# Patient Record
Sex: FEMALE | Race: WHITE | NOT HISPANIC OR LATINO | Employment: UNEMPLOYED | URBAN - METROPOLITAN AREA
[De-identification: names, ages, dates, MRNs, and addresses within clinical notes are randomized per-mention and may not be internally consistent; named-entity substitution may affect disease eponyms.]

---

## 2017-01-21 ENCOUNTER — TELEPHONE (OUTPATIENT)
Dept: PEDIATRICS | Facility: CLINIC | Age: 52
End: 2017-01-21

## 2017-01-21 NOTE — TELEPHONE ENCOUNTER
1/21/2017    Call Regarding Preventive Health Screening Colonoscopy, Mammogram and Cervical/PAP    Attempt 1    Message on voicemail     Comments:       Outreach   cnt

## 2017-03-02 ENCOUNTER — TELEPHONE (OUTPATIENT)
Dept: PEDIATRICS | Facility: CLINIC | Age: 52
End: 2017-03-02

## 2017-03-02 NOTE — TELEPHONE ENCOUNTER
Panel Management Review      Patient has the following on her problem list: None      Composite cancer screening  Chart review shows that this patient is due/due soon for the following Pap Smear, Mammogram and Colonoscopy  Summary:    Patient is due/failing the following:   COLONOSCOPY, MAMMOGRAM, PAP and PHYSICAL    Action needed:   Patient needs office visit for Px, PAP, mammogram, colonoscopy.    Type of outreach:    Sent [facility] message.    Questions for provider review:    None                                                                                                                                    Dorothy Lundy LPN       Chart routed to Care Team .

## 2017-05-17 NOTE — TELEPHONE ENCOUNTER
5/17/2017    Call Regarding Preventive Health Screening Colonoscopy, Mammogram and Cervical/PAP    Attempt 2    Message on voicemail     Comments:       Outreach   VISHNU

## 2017-06-20 NOTE — TELEPHONE ENCOUNTER
Call Regarding Preventive Health Screening Colonoscopy, Mammogram and Cervical/PAP    Attempt 3    Message on voicemail     Comments:       Outreach   Giovana Luna

## 2017-07-08 ENCOUNTER — HEALTH MAINTENANCE LETTER (OUTPATIENT)
Age: 52
End: 2017-07-08

## 2017-12-19 ASSESSMENT — ANXIETY QUESTIONNAIRES
3. WORRYING TOO MUCH ABOUT DIFFERENT THINGS: SEVERAL DAYS
GAD7 TOTAL SCORE: 2
GAD7 TOTAL SCORE: 2
4. TROUBLE RELAXING: NOT AT ALL
5. BEING SO RESTLESS THAT IT IS HARD TO SIT STILL: NOT AT ALL
7. FEELING AFRAID AS IF SOMETHING AWFUL MIGHT HAPPEN: NOT AT ALL
2. NOT BEING ABLE TO STOP OR CONTROL WORRYING: NOT AT ALL
1. FEELING NERVOUS, ANXIOUS, OR ON EDGE: NOT AT ALL
7. FEELING AFRAID AS IF SOMETHING AWFUL MIGHT HAPPEN: NOT AT ALL
6. BECOMING EASILY ANNOYED OR IRRITABLE: SEVERAL DAYS

## 2017-12-19 ASSESSMENT — ENCOUNTER SYMPTOMS
JOINT SWELLING: 0
SORE THROAT: 0
MUSCLE WEAKNESS: 0
HOARSE VOICE: 0
HOT FLASHES: 1
SMELL DISTURBANCE: 0
MYALGIAS: 0
TASTE DISTURBANCE: 0
MUSCLE CRAMPS: 0
SINUS CONGESTION: 0
TROUBLE SWALLOWING: 0
SINUS PAIN: 0
NECK PAIN: 0
ARTHRALGIAS: 1
DECREASED LIBIDO: 0
STIFFNESS: 1
NECK MASS: 0
BACK PAIN: 0

## 2017-12-20 ENCOUNTER — OFFICE VISIT (OUTPATIENT)
Dept: OBGYN | Facility: CLINIC | Age: 52
End: 2017-12-20
Attending: NURSE PRACTITIONER
Payer: COMMERCIAL

## 2017-12-20 ENCOUNTER — RADIANT APPOINTMENT (OUTPATIENT)
Dept: MAMMOGRAPHY | Facility: CLINIC | Age: 52
End: 2017-12-20
Attending: INTERNAL MEDICINE
Payer: COMMERCIAL

## 2017-12-20 VITALS
WEIGHT: 126.5 LBS | DIASTOLIC BLOOD PRESSURE: 68 MMHG | HEIGHT: 68 IN | SYSTOLIC BLOOD PRESSURE: 122 MMHG | BODY MASS INDEX: 19.17 KG/M2 | HEART RATE: 56 BPM

## 2017-12-20 DIAGNOSIS — Z12.4 SCREENING FOR MALIGNANT NEOPLASM OF CERVIX: ICD-10-CM

## 2017-12-20 DIAGNOSIS — Z23 NEED FOR PROPHYLACTIC VACCINATION AND INOCULATION AGAINST INFLUENZA: ICD-10-CM

## 2017-12-20 DIAGNOSIS — Z01.419 ENCOUNTER FOR GYNECOLOGICAL EXAMINATION WITHOUT ABNORMAL FINDING: ICD-10-CM

## 2017-12-20 DIAGNOSIS — Z00.00 VISIT FOR PREVENTIVE HEALTH EXAMINATION: Primary | ICD-10-CM

## 2017-12-20 DIAGNOSIS — Z12.31 VISIT FOR SCREENING MAMMOGRAM: ICD-10-CM

## 2017-12-20 PROCEDURE — G0008 ADMIN INFLUENZA VIRUS VAC: HCPCS | Mod: ZF

## 2017-12-20 PROCEDURE — 99212 OFFICE O/P EST SF 10 MIN: CPT | Mod: 25

## 2017-12-20 PROCEDURE — 25000128 H RX IP 250 OP 636: Mod: ZF

## 2017-12-20 PROCEDURE — G0145 SCR C/V CYTO,THINLAYER,RESCR: HCPCS | Performed by: NURSE PRACTITIONER

## 2017-12-20 PROCEDURE — 87624 HPV HI-RISK TYP POOLED RSLT: CPT | Performed by: NURSE PRACTITIONER

## 2017-12-20 PROCEDURE — 90686 IIV4 VACC NO PRSV 0.5 ML IM: CPT | Mod: ZF

## 2017-12-20 PROCEDURE — G0202 SCR MAMMO BI INCL CAD: HCPCS

## 2017-12-20 ASSESSMENT — ANXIETY QUESTIONNAIRES: GAD7 TOTAL SCORE: 2

## 2017-12-20 ASSESSMENT — PATIENT HEALTH QUESTIONNAIRE - PHQ9: SUM OF ALL RESPONSES TO PHQ QUESTIONS 1-9: 0

## 2017-12-20 NOTE — PROGRESS NOTES
Progress Note    SUBJECTIVE:  Vonnie Nguyen is an 52 year old, , who requests an Annual Preventive Exam.   Hx of hypothyroidism, managed by PCP in [place].    Concerns today include: none    Menstrual History:  Menstrual History 2016   LAST MENSTRUAL PERIOD 10/30/2016 2017 -   Menarche age - - 13   Period Cycle (Days) - - 29-36   Period Duration (Days) - - 5 days   Method of Contraception - - None   Period Pattern - - Irregular   Menstrual Flow - - Moderate   Menstrual Control - - Tampon   Dysmenorrhea - - None   Reviewed Today - - Yes     Had 1 span of 3-4 months with no menses last year.  Then started back up having regular menses, once per month.  Bleeding is light to moderate; lasts ~5 days, without any significant cramping.  Started to have night sweats and hot flashes ~1 yr ago.  Has made lifestyle changes, including stopping drinking caffinated and alcoholic beverages and taking a Vitamin E supplement.  States she no longer has any vasomotor symptoms; denies vaginal dryness.     Sexual Hx:  - Sexually with , in mutually monogamous relationship; decline STD testing today  - No sexual concerns  - Contraception =  had vasectomy      Social Hx:  - Homemaker, has two children ages 14 and 16 who are very active; enjoys going to the [employer] in [place] with her family    Last pap smear: 5/15/2012 NIL   History of abnormal Pap smear: NO - age 30-65 PAP every 5 years with negative HPV co-testing recommended    Mammogram current: yes, completed this morning  Last Mammogram:   Ma Screening Digital Bilateral    Result Date: 2014  Narrative: Examination: Bilateral digital screening mammography with computer aided detection. 9/3/2014 Comparison: Mammogram 2012, 2000 and, 2010, 2006 History: No symptoms, routine screening. No personal or family history of breast cancer. BREAST DENSITY: Extremely dense. COMMENTS: No significant change since prior.           Diet:  - Eats very little iron rich foods, takes a supplement of iron, which has given her more energy (325mg daily of ferrous sulfate)  - 4-5 fruits/ vegetables per day; 2-3 servings of calcium per day; takes a calcium and Vitamin D supplement    Exercise: exercises often; enjoys going playing hockey, skating, and going to the gym    Last Colonoscopy: never had a colonoscopy, due    HISTORY:    Current Outpatient Prescriptions on File Prior to Visit:  Glucosamine-Chondroitin (GLUCOSAMINE CHONDR COMPLEX PO)    vitamin E (TOCOPHEROL) 1000 UNIT capsule Take 2,000 Units by mouth daily   levothyroxine (SYNTHROID, LEVOTHROID) 150 MCG tablet Take 1 tablet (150 mcg) by mouth daily   multivitamin, therapeutic with minerals (MULTI-VITAMIN) TABS Take 1 tablet by mouth daily   Calcium Carbonate-Vitamin D (CALCIUM 600+D PO)    IRON PO    Probiotic Product (PROBIOTIC DAILY PO)      No current facility-administered medications on file prior to visit.   Allergies   Allergen Reactions     No Known Allergies      Immunization History   Administered Date(s) Administered     Flu > 3 Years 2017     Influenza (IIV3) PF 2010     Influenza Vaccine IM 3yrs+ 4 Valent IIV4 10/02/2015, 2016     TDAP Vaccine (Adacel) 2013     Obstetric History       T1      L2     SAB2   TAB0   Ectopic0   Multiple0   Live Births2      Past Medical History:   Diagnosis Date     Grave's disease     Iodine 131 in      Hypothyroidism      Menarche age 13    cycles q  28-30 x 5 d     Past Surgical History:   Procedure Laterality Date     EXTRACTION(S) DENTAL  age 21     Family History   Problem Relation Age of Onset     Hypertension Mother 64     CANCER Mother      lung Ca (smoker) basal skin     Other Cancer Mother      Lung cancer     Prostate Cancer Father       age 78     Social History     Social History     Marital status:      Spouse name: N/A     Number of children: N/A     Years of education: N/A      Occupational History     housewife/parent      RN on Transplant unit 'till 2010     Social History Main Topics     Smoking status: Never Smoker     Smokeless tobacco: Never Used     Alcohol use Yes      Comment: 1-2s 3x/wk     Drug use: No     Sexual activity: Yes     Partners: Male     Birth control/ protection: Male Surgical      Comment:  had vastectomy     Other Topics Concern     Blood Transfusions No     Caffeine Concern No     1-2/d     Occupational Exposure No     Hobby Hazards Yes     Ice hockey     Sleep Concern No     light sleeper since having having kids 10 yrs 2002.     Stress Concern No     parenting busy schedule-->coping since D/C'd work 2010     Weight Concern No     Special Diet No     Back Care No     Exercise No     Ice hockey 90' 3x/wk!     Bike Helmet No     Seat Belt No     Social History Narrative    How much exercise per week? 4 days    How much calcium per day? 600mg       How much caffeine per day? None    How much vitamin D per day? Supplement    Do you/your family wear seatbelts?  Yes    Do you/your family use safety helmets? yes    Do you/your family use sunscreen? Yes    Do you/your family keep firearms in the home? Yes    Do you/your family have a smoke detector(s)? Yes    Do you feel safe in your home? Yes    Has anyone ever touched you in an unwanted manner? No     Explain     SEE ELIAS MEJIA 09/03/2014     Reviewed Rafal ROMANO  PHQ-9 SCORE 12/20/2017   Total Score 0     NADEEM-7 SCORE 12/19/2017   Total Score 2 (minimal anxiety)   Total Score 2   Answers for HPI/ROS submitted by the patient on 12/19/2017   NADEEM 7 TOTAL SCORE: 2  PHQ-2 Score: 0  General Symptoms: No  Skin Symptoms: No  HENT Symptoms: Yes  EYE SYMPTOMS: No  HEART SYMPTOMS: No  LUNG SYMPTOMS: No  INTESTINAL SYMPTOMS: No  URINARY SYMPTOMS: No  GYNECOLOGIC SYMPTOMS: Yes  BREAST SYMPTOMS: No  SKELETAL SYMPTOMS: Yes  BLOOD SYMPTOMS: No  NERVOUS SYSTEM SYMPTOMS: No  MENTAL HEALTH SYMPTOMS: No  Ear pain:  "No  Ear discharge: No  Hearing loss: Yes  Tinnitus: Yes  Nosebleeds: No  Congestion: No  Sinus pain: No  Trouble swallowing: No   Voice hoarseness: No  Mouth sores: No  Sore throat: No  Tooth pain: No  Gum tenderness: No  Bleeding gums: No  Change in taste: No  Change in sense of smell: No  Dry mouth: No  Hearing aid used: No  Neck lump: No  Back pain: No  Muscle aches: No  Neck pain: No  Swollen joints: No  Joint pain: Yes  Bone pain: No  Muscle cramps: No  Muscle weakness: No  Joint stiffness: Yes  Bone fracture: No  Bleeding or spotting between periods: No  Heavy or painful periods: No  Irregular periods: Yes  Vaginal discharge: No  Hot flashes: Yes  Vaginal dryness: No  Genital ulcers: No  Reduced libido: No  Painful intercourse: No  Difficulty with sexual arousal: No  Post-menopausal bleeding: No    Feels safe. Reports mental health is well. Exercise helps with any mood changes or feeling down in the winter and gives her more energy. States she has a supportive  and close family.    EXAM:  Blood pressure 122/68, pulse 56, height 1.715 m (5' 7.52\"), weight 57.4 kg (126 lb 8 oz), last menstrual period 11/30/2017, not currently breastfeeding. Body mass index is 19.51 kg/(m^2).  General - pleasant female in no acute distress.  Skin - no suspicious lesions or rashes  EENT-  PERRLA, euthyroid with out palpable nodules  Neck - supple without lymphadenopathy.  Lungs - clear to auscultation bilaterally.  Heart - regular rate and rhythm without murmur.  Abdomen - soft, nontender, nondistended, no masses or organomegaly noted.  Musculoskeletal - no gross deformities.  Neurological - normal strength, sensation, and mental status.    Breast Exam:  Breast: Without visible skin changes. No dimpling or lesions seen.   Breasts supple, non-tender with palpation, no dominant mass, nodularity, or nipple discharge noted bilaterally. Axillary nodes negative.      Pelvic Exam:  EG/BUS: Normal genital architecture without " lesions; mild estrogen loss atrophy, erythema or abnormal secretions; Bartholin's, Urethra, [place]'s normal   Urethral meatus: normal   Urethra: no masses, tenderness, or scarring   Bladder: no masses or tenderness   Vagina: vaginal tissue pink, minimal rugae; creamy, white, odorless secretions  Cervix: pink, moist, closed, without lesion or CMT; friable with pap smear  Uterus: anteverted,  and small, smooth, firm, mobile w/o pain  Adnexa: Within normal limits and No masses, nodularity, tenderness  Rectum: anus normal     ASSESSMENT:  Encounter Diagnoses   Name Primary?     Visit for preventive health examination Yes     Need for prophylactic vaccination and inoculation against influenza      Screening for malignant neoplasm of cervix      Encounter for gynecological examination without abnormal finding         PLAN:   Orders Placed This Encounter   Procedures     Pelvic and Breast Exam Procedure []     Pap Smear Exam [] Do Not Remove     FLU VACCINE, 3 YRS +, IM [[id number]]     Pap imaged thin layer screen with HPV - recommended age 30 - 65 years (select HPV order below)     HPV High Risk Types DNA Cervical     Flu vaccine administered today  Pap smear with HPV testing completed today  Encouraged colonoscopy; patient will consider and talk to her PCP about alternative options for colon cancer screening  Additional teaching done at this visit regarding calcium (1200 mg per day), self breast exam, exercise, perimenopause, mental health and weight/diet.  Patient to notify provider if she experiences a change in her menses, especially prolonged or heavy bleeding.    Return to clinic in one year.  Follow-up as needed.    Pamela Winn, ALBERT, APRN, WHNP

## 2017-12-20 NOTE — LETTER
2017       RE: Vonnie Nguyen  [id number]  [place] [place] [id number]     Dear Colleague,    Thank you for referring your patient, Vonnie Nguyen, to the [facility] at [facility]. Please see a copy of my visit note below.      Progress Note    SUBJECTIVE:  Vonnie Nguyen is an 52 year old, , who requests an Annual Preventive Exam.   Hx of hypothyroidism, managed by PCP in [place].    Concerns today include: none    Menstrual History:  Menstrual History 2016   LAST MENSTRUAL PERIOD 10/30/2016 2017 -   Menarche age - - 13   Period Cycle (Days) - - 29-36   Period Duration (Days) - - 5 days   Method of Contraception - - None   Period Pattern - - Irregular   Menstrual Flow - - Moderate   Menstrual Control - - Tampon   Dysmenorrhea - - None   Reviewed Today - - Yes     Had 1 span of 3-4 months with no menses last year.  Then started back up having regular menses, once per month.  Bleeding is light to moderate; lasts ~5 days, without any significant cramping.  Started to have night sweats and hot flashes ~1 yr ago.  Has made lifestyle changes, including stopping drinking caffinated and alcoholic beverages and taking a Vitamin E supplement.  States she no longer has any vasomotor symptoms; denies vaginal dryness.     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 9/3/2014 Comparison: Mammogram 2012, 2000 and, 2010, 2006 History: No symptoms, routine screening. No personal or family history of breast cancer. BREAST DENSITY: Extremely dense. COMMENTS: No significant change since prior.          Diet:  - Eats very little iron rich foods, takes a supplement of iron, which has given her more energy (325mg daily of ferrous sulfate)  - 4-5 fruits/ vegetables per day; 2-3 servings of calcium per day; takes a calcium and Vitamin D supplement    Exercise: exercises often; enjoys going playing hockey, skating, and going to the gym    Last Colonoscopy: never had a colonoscopy, due    HISTORY:    Current Outpatient Prescriptions on File Prior to Visit:  Glucosamine-Chondroitin (GLUCOSAMINE CHONDR COMPLEX PO)    vitamin E (TOCOPHEROL) 1000 UNIT capsule Take 2,000 Units by mouth daily   levothyroxine (SYNTHROID, LEVOTHROID) 150 MCG tablet Take 1 tablet (150 mcg) by mouth daily   multivitamin, therapeutic with minerals (MULTI-VITAMIN) TABS Take 1 tablet by mouth daily   Calcium Carbonate-Vitamin D (CALCIUM 600+D PO)    IRON PO    Probiotic Product (PROBIOTIC DAILY PO)      No current facility-administered medications on file prior to visit.   Allergies   Allergen Reactions     No Known Allergies      Immunization History   Administered Date(s) Administered     Flu > 3 Years 2017     Influenza (IIV3) PF 2010     Influenza Vaccine IM 3yrs+ 4 Valent IIV4 10/02/2015, 2016     TDAP Vaccine (Adacel) 2013     Obstetric History       T1      L2     SAB2   TAB0   Ectopic0   Multiple0   Live Births2      Past Medical History:   Diagnosis Date     Grave's disease 1980    Iodine 131 in      Hypothyroidism      Menarche age 13    cycles q  28-30 x 5 d     Past Surgical History:   Procedure Laterality Date     EXTRACTION(S) DENTAL  age 21     Family History   Problem Relation Age of Onset      Hypertension Mother 64     CANCER Mother      lung Ca (smoker) basal skin     Other Cancer Mother      Lung cancer     Prostate Cancer Father       age 78     Social History     Social History     Marital status:      Spouse name: N/A     Number of children: N/A     Years of education: N/A     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     Stress Concern No     parenting busy schedule-->coping since D/C'd work      Weight Concern No     Special Diet No     Back Care No     Exercise No     Ice hockey 90' 3x/wk!     Bike Helmet No     Seat Belt No     Social History Narrative    How much exercise per week? 4 days    How much calcium per day? 600mg       How much caffeine per day? None    How much vitamin D per day? Supplement    Do you/your family wear seatbelts?  Yes    Do you/your family use safety helmets? yes    Do you/your family use sunscreen? Yes    Do you/your family keep firearms in the home? Yes    Do you/your family have a smoke detector(s)? Yes    Do you feel safe in your home? Yes    Has anyone ever touched you in an unwanted manner? No     Explain     SEE ELIAS MEJIA 2014     Reviewed Rafal ROMANO  PHQ-9 SCORE 2017   Total Score 0     NADEEM-7 SCORE 2017   Total Score 2 (minimal anxiety)   Total Score 2   Answers for HPI/ROS submitted by the patient on 2017   NADEEM 7 TOTAL SCORE: 2  PHQ-2 Score: 0  General Symptoms:  "No  Skin Symptoms: No  HENT Symptoms: Yes  EYE SYMPTOMS: No  HEART SYMPTOMS: No  LUNG SYMPTOMS: No  INTESTINAL SYMPTOMS: No  URINARY SYMPTOMS: No  GYNECOLOGIC SYMPTOMS: Yes  BREAST SYMPTOMS: No  SKELETAL SYMPTOMS: Yes  BLOOD SYMPTOMS: No  NERVOUS SYSTEM SYMPTOMS: No  MENTAL HEALTH SYMPTOMS: No  Ear pain: No  Ear discharge: No  Hearing loss: Yes  Tinnitus: Yes  Nosebleeds: No  Congestion: No  Sinus pain: No  Trouble swallowing: No   Voice hoarseness: No  Mouth sores: No  Sore throat: No  Tooth pain: No  Gum tenderness: No  Bleeding gums: No  Change in taste: No  Change in sense of smell: No  Dry mouth: No  Hearing aid used: No  Neck lump: No  Back pain: No  Muscle aches: No  Neck pain: No  Swollen joints: No  Joint pain: Yes  Bone pain: No  Muscle cramps: No  Muscle weakness: No  Joint stiffness: Yes  Bone fracture: No  Bleeding or spotting between periods: No  Heavy or painful periods: No  Irregular periods: Yes  Vaginal discharge: No  Hot flashes: Yes  Vaginal dryness: No  Genital ulcers: No  Reduced libido: No  Painful intercourse: No  Difficulty with sexual arousal: No  Post-menopausal bleeding: No    Feels safe. Reports mental health is well. Exercise helps with any mood changes or feeling down in the winter and gives her more energy. States she has a supportive  and close family.    EXAM:  Blood pressure 122/68, pulse 56, height 1.715 m (5' 7.52\"), weight 57.4 kg (126 lb 8 oz), last menstrual period 11/30/2017, not currently breastfeeding. Body mass index is 19.51 kg/(m^2).  General - pleasant female in no acute distress.  Skin - no suspicious lesions or rashes  EENT-  PERRLA, euthyroid with out palpable nodules  Neck - supple without lymphadenopathy.  Lungs - clear to auscultation bilaterally.  Heart - regular rate and rhythm without murmur.  Abdomen - soft, nontender, nondistended, no masses or organomegaly noted.  Musculoskeletal - no gross deformities.  Neurological - normal strength, sensation, and " mental status.    Breast Exam:  Breast: Without visible skin changes. No dimpling or lesions seen.   Breasts supple, non-tender with palpation, no dominant mass, nodularity, or nipple discharge noted bilaterally. Axillary nodes negative.      Pelvic Exam:  EG/BUS: Normal genital architecture without lesions; mild estrogen loss atrophy, erythema or abnormal secretions; Bartholin's, Urethra, [place]'s normal   Urethral meatus: normal   Urethra: no masses, tenderness, or scarring   Bladder: no masses or tenderness   Vagina: vaginal tissue pink, minimal rugae; creamy, white, odorless secretions  Cervix: pink, moist, closed, without lesion or CMT; friable with pap smear  Uterus: anteverted,  and small, smooth, firm, mobile w/o pain  Adnexa: Within normal limits and No masses, nodularity, tenderness  Rectum: anus normal     ASSESSMENT:  Encounter Diagnoses   Name Primary?     Visit for preventive health examination Yes     Need for prophylactic vaccination and inoculation against influenza      Screening for malignant neoplasm of cervix      Encounter for gynecological examination without abnormal finding         PLAN:   Orders Placed This Encounter   Procedures     Pelvic and Breast Exam Procedure []     Pap Smear Exam [] Do Not Remove     FLU VACCINE, 3 YRS +, IM [[id number]]     Pap imaged thin layer screen with HPV - recommended age 30 - 65 years (select HPV order below)     HPV High Risk Types DNA Cervical     Flu vaccine administered today  Pap smear with HPV testing completed today  Encouraged colonoscopy; patient will consider and talk to her PCP about alternative options for colon cancer screening  Additional teaching done at this visit regarding calcium (1200 mg per day), self breast exam, exercise, perimenopause, mental health and weight/diet.  Patient to notify provider if she experiences a change in her menses, especially prolonged or heavy bleeding.        Return to clinic in one year.  Follow-up as  needed.    Pamela Winn, DNP, APRN, WHNP

## 2017-12-20 NOTE — MR AVS SNAPSHOT
After Visit Summary   12/20/2017    Vonnie Nguyen    MRN: [id number]           Patient Information     Date Of Birth          1965        Visit Information        Provider Department      12/20/2017 1:00 PM Pamela Winn APRN CNP [facility]        Today's Diagnoses     Visit for preventive health examination        Need for prophylactic vaccination and inoculation against influenza        Screening for malignant neoplasm of cervix        Encounter for gynecological examination without abnormal finding          Care Instructions    Pap smear was done today  Flu vaccine was administered today  Consider colonoscopy or other colon cancer screening test; may discuss with primary care provider.      PREVENTIVE HEALTH RECOMMENDATIONS:   Most women need a yearly breast and pelvic exam.    A PAP screen, a test done DURING a pelvic exam, is NO longer recommended yearly.    March 2013, screening guidelines recommended by ACOG for PAP screen are:    1) First pap at age 21.    2) Pap every 3 years until age 30.    3) After age 30, pap every 3 years or Pap with HR HPV screen every 5 years until age 65.  4) Women do NOT need a vaginal Pap screen after a hysterectomy (surgical removal of the uterus) when they have not had cancer.    Exceptions:  1) Yearly pap if HIV+ or immunosuppressed secondary to organ transplant  2) HUGO II-III continue routine screening for 20 years.    I encourage you continue looking for opportunities to choose a healthy lifestyle:       * Choose to eat a heart healthy diet. Check out the FOOD PLATE guidelines at: http://www.choosemyplate.gov/ for helpful hints on weight and cholesterol management.  Balance your caloric intake with exercise to maintain a BMI in the 22 to 26 range. For bone health: Eat calcium-rich foods like yogurt, broccoli or take chewable calcium pills (500 to 600 mg) twice a day with food.       * Exercise for at least an average of 30  minutes a day, 5 days of the week. This will help you control your weight, release stress, and help prevent disease.      * Take a Vitamin D3 supplement daily fall through spring and during summer unless you cpdc47-39' full body sun exposure to skin without sunscreen.      * DO wear sunscreen to prevent skin cancer after the first 15-30 minutes.      * Identify stressors in your life, find ways to release the stress, and, make time for yourself. PLEASE ask for help if mood changes last longer than two weeks.     * Limit alcohol to one drink per day.  No smoking.  Avoid second hand smoke. If you smoke, ask for help to stop.       *  If you are in a sexual relationship, talk with your partner about possible infection risks and take action to protect yourself from exposure to a sexual infection.    Please request an infection screen for STIs (sexually transmitted infections) if you are less than age 26 OR believe that you may be at risk.     Get a flu shot each year. Get a tetanus shot every 10 years. EVERYONE needs a pertussis (Whooping cough) booster.    See your dentist twice a year for an exam and preventive care cleaning.     Consider the following screen tests:    1) cholesterol test every 5 years.     2) yearly mammogram after age 40 unless you have identified risks.    3) colonoscopy every 10 years after age 50 unless you have identified risks.    4) diabetes blood test screening if you are at risk for diabetes.      Additional information that you may also find helpful:  The Internet now gives us access to LOTS of information -- some of it helpful, research documented and also plenty of harmful, anecdotal information that may not pertain to your situtaion. Consider visiting the following websites for accurate health information:    www.vitamindcouncil.org/ : Info and ongoing research re Vitamin D    www.fairview.org : Up to date and easily searchable information on multiple topics.    www.medlineplus.gov :  medication info, interactive tutorials, watch real surgeries online    www.cdc.gov : public health info, travel advisories, epidemics (H1N1)    www.eda/std.org: current research re diagnosis, treatment and prevention of sexually contacted infections.    www.health.state.[place] : [place] dept [facility], public health issues in [place], N1N1    www.familydoctor.org : good info from the Academy of Family Physicians                Follow-ups after your visit        Follow-up notes from your care team     Return in 1 year (on 12/20/2018) for Preventative Health Visit.      Who to contact     Please call your clinic at [phone] to:    Ask questions about your health    Make or cancel appointments    Discuss your medicines    Learn about your test results    Speak to your doctor   If you have compliments or concerns about an experience at your clinic, or if you wish to file a complaint, please contact [facility] Patient Relations at [phone] or email us at [email or web address]         Additional Information About Your Visit        [email or web address] Information     Kout gives you secure access to your electronic health record. If you see a primary care provider, you can also send messages to your care team and make appointments. If you have questions, please call your primary care clinic.  If you do not have a primary care provider, please call [phone] and they will assist you.      Kout is an electronic gateway that provides easy, online access to your medical records. With Kout, you can request a clinic appointment, read your test results, renew a prescription or communicate with your care team.     To access your existing account, please contact your [facility] or call [phone] for assistance.        Care EveryWhere ID     This is your Care EveryWhere ID. This could be used by other organizations to access your [facility]  "records  [id number]        Your Vitals Were     Pulse Height Last Period BMI (Body Mass Index)          56 1.715 m (5' 7.52\") 11/30/2017 19.51 kg/m2         Blood Pressure from Last 3 Encounters:   12/20/17 122/68   11/21/16 112/74   10/02/15 102/64    Weight from Last 3 Encounters:   12/20/17 57.4 kg (126 lb 8 oz)   11/21/16 55.4 kg (122 lb 1.6 oz)   10/02/15 57.8 kg (127 lb 8 oz)              We Performed the Following     FLU VACCINE, 3 YRS +, IM [45798]     HPV High Risk Types DNA Cervical     Pap imaged thin layer screen with HPV - recommended age 30 - 65 years (select HPV order below)     Pap Smear Exam [] Do Not Remove     Pelvic and Breast Exam Procedure []        Primary Care Provider Office Phone # Fax #    Glo Enriquez [phone] [address] [place] [address]        Equal Access to Services     [facility]: Hadii jazmyn ku hadasho Soomaali, waaxda luqadaha, qaybta kaalmada adeegyajosse, tavo vasquez . So [facility] [phone].    ATENCIÓN: Si habla español, tiene a knutson disposición servicios gratuitos de asistencia lingüística. Llame al [phone].    We comply with applicable federal civil rights laws and [place] laws. We do not discriminate on the basis of race, color, national origin, age, disability, sex, sexual orientation, or gender identity.            Thank you!     Thank you for choosing [facility]  for your care. Our goal is always to provide you with excellent care. Hearing back from our patients is one way we can continue to improve our services. Please take a few minutes to complete the written survey that you may receive in the mail after your visit with us. Thank you!             Your Updated Medication List - Protect others around you: Learn how to safely use, store and throw away your medicines at www.disposemymeds.org.          This list is accurate as of: 12/20/17  1:50 PM.  Always " use your most recent med list.                   Brand Name Dispense Instructions for use Diagnosis    CALCIUM 600+D PO           GLUCOSAMINE CHONDR COMPLEX PO           IRON PO           levothyroxine 150 MCG tablet    SYNTHROID/LEVOTHROID    90 tablet    Take 1 tablet (150 mcg) by mouth daily    Postablative hypothyroidism       Multi-vitamin Tabs tablet      Take 1 tablet by mouth daily        PROBIOTIC DAILY PO           vitamin E 1000 UNIT capsule    TOCOPHEROL     Take 2,000 Units by mouth daily

## 2017-12-20 NOTE — PATIENT INSTRUCTIONS
Pap smear was done today  Flu vaccine was administered today  Consider colonoscopy or other colon cancer screening test; may discuss with primary care provider.      PREVENTIVE HEALTH RECOMMENDATIONS:   Most women need a yearly breast and pelvic exam.    A PAP screen, a test done DURING a pelvic exam, is NO longer recommended yearly.    March 2013, screening guidelines recommended by ACOG for PAP screen are:    1) First pap at age 21.    2) Pap every 3 years until age 30.    3) After age 30, pap every 3 years or Pap with HR HPV screen every 5 years until age 65.  4) Women do NOT need a vaginal Pap screen after a hysterectomy (surgical removal of the uterus) when they have not had cancer.    Exceptions:  1) Yearly pap if HIV+ or immunosuppressed secondary to organ transplant  2) HUGO II-III continue routine screening for 20 years.    I encourage you continue looking for opportunities to choose a healthy lifestyle:       * Choose to eat a heart healthy diet. Check out the FOOD PLATE guidelines at: http://www.Qpynplate.gov/ for helpful hints on weight and cholesterol management.  Balance your caloric intake with exercise to maintain a BMI in the 22 to 26 range. For bone health: Eat calcium-rich foods like yogurt, broccoli or take chewable calcium pills (500 to 600 mg) twice a day with food.       * Exercise for at least an average of 30 minutes a day, 5 days of the week. This will help you control your weight, release stress, and help prevent disease.      * Take a Vitamin D3 supplement daily fall through spring and during summer unless you veim17-30' full body sun exposure to skin without sunscreen.      * DO wear sunscreen to prevent skin cancer after the first 15-30 minutes.      * Identify stressors in your life, find ways to release the stress, and, make time for yourself. PLEASE ask for help if mood changes last longer than two weeks.     * Limit alcohol to one drink per day.  No smoking.  Avoid second hand  smoke. If you smoke, ask for help to stop.       *  If you are in a sexual relationship, talk with your partner about possible infection risks and take action to protect yourself from exposure to a sexual infection.    Please request an infection screen for STIs (sexually transmitted infections) if you are less than age 26 OR believe that you may be at risk.     Get a flu shot each year. Get a tetanus shot every 10 years. EVERYONE needs a pertussis (Whooping cough) booster.    See your dentist twice a year for an exam and preventive care cleaning.     Consider the following screen tests:    1) cholesterol test every 5 years.     2) yearly mammogram after age 40 unless you have identified risks.    3) colonoscopy every 10 years after age 50 unless you have identified risks.    4) diabetes blood test screening if you are at risk for diabetes.      Additional information that you may also find helpful:  The Internet now gives us access to LOTS of information -- some of it helpful, research documented and also plenty of harmful, anecdotal information that may not pertain to your situtaion. Consider visiting the following websites for accurate health information:    www.vitamindcouncil.org/ : Info and ongoing research re Vitamin D    www.fairview.org : Up to date and easily searchable information on multiple topics.    www.medlineplus.gov : medication info, interactive tutorials, watch real surgeries online    www.cdc.gov : public health info, travel advisories, epidemics (H1N1)    www.eda/std.org: current research re diagnosis, treatment and prevention of sexually contacted infections.    www.health.[facility].[place] : [place] dept of heat[place], public health issues in [place], N1N1    www.familydoctor.org : good info from the Academy of Family Physicians

## 2017-12-22 LAB
COPATH REPORT: NORMAL
PAP: NORMAL

## 2017-12-27 LAB
FINAL DIAGNOSIS: NORMAL
HPV HR 12 DNA CVX QL NAA+PROBE: NEGATIVE
HPV16 DNA SPEC QL NAA+PROBE: NEGATIVE
HPV18 DNA SPEC QL NAA+PROBE: NEGATIVE
SPECIMEN DESCRIPTION: NORMAL

## 2018-01-22 DIAGNOSIS — E89.0 POSTABLATIVE HYPOTHYROIDISM: ICD-10-CM

## 2018-01-22 NOTE — TELEPHONE ENCOUNTER
"Requested Prescriptions   Pending Prescriptions Disp Refills     levothyroxine (SYNTHROID/LEVOTHROID) 150 MCG tablet  Last Written Prescription Date:  11/21/16  Last Fill Quantity: 90 TABLET,  # refills: 3   Last Office Visit with [facility] or [facility] prescribing provider:  11/21/16   Future Office Visit:      90 tablet 3     Sig: Take 1 tablet (150 mcg) by mouth daily    Thyroid Protocol Failed    1/22/2018 11:01 AM       Failed - Recent or future visit with authorizing provider's specialty    Patient had office visit in the last year or has a visit in the next 30 days with authorizing provider.  See \"Patient Info\" tab in inbasket, or \"Choose Columns\" in Meds & Orders section of the refill encounter.            Failed - Normal TSH on file in past 12 months    Recent Labs   Lab Test  11/21/16   1511   TSH  0.77             Passed - Patient is 12 years or older       Passed - No active pregnancy on record    If patient is pregnant or has had a positive pregnancy test, please check TSH.         Passed - No positive pregnancy test in past 12 months    If patient is pregnant or has had a positive pregnancy test, please check TSH.            "

## 2018-01-23 ENCOUNTER — MYC REFILL (OUTPATIENT)
Dept: PEDIATRICS | Facility: CLINIC | Age: 53
End: 2018-01-23

## 2018-01-23 DIAGNOSIS — E89.0 POSTABLATIVE HYPOTHYROIDISM: ICD-10-CM

## 2018-01-24 RX ORDER — LEVOTHYROXINE SODIUM 150 UG/1
150 TABLET ORAL DAILY
Qty: 90 TABLET | Refills: 3 | OUTPATIENT
Start: 2018-01-24

## 2018-01-24 NOTE — TELEPHONE ENCOUNTER
Message from MyChart:  Original authorizing provider: MD Vonnie Galan would like a refill of the following medications:  levothyroxine (SYNTHROID, LEVOTHROID) 150 MCG tablet [Glo Enriquez MD]    Preferred pharmacy: [facility] [id number] [place] [address] - [address]    Comment:

## 2018-01-24 NOTE — TELEPHONE ENCOUNTER
Duplicate encounter. rx sent for 30 days. Needs appointment labs. Last 11/2016.    Glo Enriquez MD  Internal Medicine/Pediatrics

## 2018-01-24 NOTE — TELEPHONE ENCOUNTER
2 other duplicate encounters. Sent 30 day supply with no refills and message with patient to schedule follow-up and labs.    Glo Enriquez MD  Internal Medicine/Pediatrics

## 2018-02-01 ENCOUNTER — OFFICE VISIT (OUTPATIENT)
Dept: PEDIATRICS | Facility: CLINIC | Age: 53
End: 2018-02-01
Payer: COMMERCIAL

## 2018-02-01 VITALS
HEIGHT: 68 IN | SYSTOLIC BLOOD PRESSURE: 104 MMHG | HEART RATE: 61 BPM | DIASTOLIC BLOOD PRESSURE: 66 MMHG | TEMPERATURE: 97.4 F | OXYGEN SATURATION: 99 % | WEIGHT: 129.4 LBS | BODY MASS INDEX: 19.61 KG/M2

## 2018-02-01 DIAGNOSIS — E89.0 POSTABLATIVE HYPOTHYROIDISM: ICD-10-CM

## 2018-02-01 DIAGNOSIS — Z12.11 SCREEN FOR COLON CANCER: ICD-10-CM

## 2018-02-01 DIAGNOSIS — Z13.1 SCREENING FOR DIABETES MELLITUS: ICD-10-CM

## 2018-02-01 DIAGNOSIS — E89.0 POSTABLATIVE HYPOTHYROIDISM: Primary | ICD-10-CM

## 2018-02-01 DIAGNOSIS — Z13.220 SCREENING CHOLESTEROL LEVEL: ICD-10-CM

## 2018-02-01 DIAGNOSIS — L98.9 SKIN LESION: ICD-10-CM

## 2018-02-01 PROCEDURE — 36415 COLL VENOUS BLD VENIPUNCTURE: CPT | Performed by: INTERNAL MEDICINE

## 2018-02-01 PROCEDURE — 84439 ASSAY OF FREE THYROXINE: CPT | Performed by: INTERNAL MEDICINE

## 2018-02-01 PROCEDURE — 99214 OFFICE O/P EST MOD 30 MIN: CPT | Performed by: INTERNAL MEDICINE

## 2018-02-01 PROCEDURE — 80061 LIPID PANEL: CPT | Performed by: INTERNAL MEDICINE

## 2018-02-01 PROCEDURE — 84443 ASSAY THYROID STIM HORMONE: CPT | Performed by: INTERNAL MEDICINE

## 2018-02-01 PROCEDURE — 80053 COMPREHEN METABOLIC PANEL: CPT | Performed by: INTERNAL MEDICINE

## 2018-02-01 RX ORDER — CHLORAL HYDRATE 500 MG
6 CAPSULE ORAL DAILY
COMMUNITY

## 2018-02-01 NOTE — MR AVS SNAPSHOT
After Visit Summary   2/1/2018    Vonnie Nguyen    MRN: [id number]           Patient Information     Date Of Birth          1965        Visit Information        Provider Department      2/1/2018 8:40 AM Glo Enriquez MD [facility]        Today's Diagnoses     Postablative hypothyroidism    -  1    Skin lesion        Screening cholesterol level        Screen for colon cancer        Screening for diabetes mellitus          Care Instructions    1. Labs this afternoon - will check cholesterol, diabetes screen, liver function, kidney function and thyroid  2. You will be contacted to set up the colonoscopy - recommend Dr. Marsh  3. Referral placed for Dermatology  4. Follow-up in 1 year          Follow-ups after your visit        Additional Services     DERMATOLOGY REFERRAL       Your provider has referred you to: [facility]: [facility] [phone]    Please be aware that coverage of these services is subject to the terms and limitations of your health insurance plan.  Call member services at your health plan with any benefit or coverage questions.      Please bring the following with you to your appointment:    (1) Any X-Rays, CTs or MRIs which have been performed.  Contact the facility where they were done to arrange for  prior to your scheduled appointment.    (2) List of current medications  (3) This referral request   (4) Any documents/labs given to you for this referral            GASTROENTEROLOGY ADULT REF PROCEDURE ONLY       Last Lab Result: Creatinine (mg/dL)       Date                     Value                 04/20/2012               0.87             ----------  There is no height or weight on file to calculate BMI.     Needed:  No  Language:  English    Patient will be contacted to schedule procedure.     Please be aware that coverage of these services is subject to the terms and limitations of your health insurance plan.  Call  member services at your health plan with any benefit or coverage questions.  Any procedures must be performed at a [facility] facility OR coordinated by your clinic's referral office.    Please bring the following with you to your appointment:    (1) Any X-Rays, CTs or MRIs which have been performed.  Contact the facility where they were done to arrange for  prior to your scheduled appointment.    (2) List of current medications   (3) This referral request   (4) Any documents/labs given to you for this referral                  Your next 10 appointments already scheduled     Feb 01, 2018  1:30 PM CST   LAB with EA LAB   [facility] ([facility])    [address]  [address]  [place] [address] [phone]           Please do not eat 10-12 hours before your appointment if you are coming in fasting for labs on lipids, cholesterol, or glucose (sugar). This does not apply to pregnant women. Water, hot tea and black coffee (with nothing added) are okay. Do not drink other fluids, diet soda or chew gum.              Future tests that were ordered for you today     Open Future Orders        Priority Expected Expires Ordered    Lipid panel reflex to direct LDL Fasting Routine  3/1/2018 2/1/2018    TSH Routine  3/1/2018 2/1/2018    T4, free Routine  3/1/2018 2/1/2018    Comprehensive metabolic panel Routine  3/1/2018 2/1/2018            Who to contact     If you have questions or need follow up information about today's clinic visit or your schedule please contact [facility] directly at [phone].  Normal or non-critical lab and imaging results will be communicated to you by MyChart, letter or phone within 4 business days after the clinic has received the results. If you do not hear from us within 7 days, please contact the clinic through MyChart or phone. If you have a critical or abnormal lab result, we will notify you by phone as soon as possible.  Submit  "refill requests through Settle or call your pharmacy and they will forward the refill request to us. Please allow 3 business days for your refill to be completed.          Additional Information About Your Visit        Aristotlhart Information     Settle gives you secure access to your electronic health record. If you see a primary care provider, you can also send messages to your care team and make appointments. If you have questions, please call your primary care clinic.  If you do not have a primary care provider, please call [phone] and they will assist you.        Care EveryWhere ID     This is your Care EveryWhere ID. This could be used by other organizations to access your [place] medical records  [id number]        Your Vitals Were     Pulse Temperature Height Pulse Oximetry BMI (Body Mass Index)       61 97.4  F (36.3  C) (Tympanic) 5' 7.5\" (1.715 m) 99% 19.97 kg/m2        Blood Pressure from Last 3 Encounters:   02/01/18 104/66   12/20/17 122/68   11/21/16 112/74    Weight from Last 3 Encounters:   02/01/18 129 lb 6.4 oz (58.7 kg)   12/20/17 126 lb 8 oz (57.4 kg)   11/21/16 122 lb 1.6 oz (55.4 kg)              We Performed the Following     DERMATOLOGY REFERRAL     GASTROENTEROLOGY ADULT REF PROCEDURE ONLY        Primary Care Provider Office Phone # Fax #    Glo Enriquez [phone] [address] [place] [address]        Equal Access to Services     [facility]: Hadii jazmyn ku hadasho Soomaali, waaxda luqadaha, qaybta kaalmada adeegyada, waxay laya lombardo adelakia johnson. So [facility] [phone].    ATENCIÓN: Si habla español, tiene a knutson disposición servicios gratuitos de asistencia lingüística. Llame al [phone].    We comply with applicable federal civil rights laws and [place] laws. We do not discriminate on the basis of race, color, national origin, age, disability, sex, sexual orientation, or gender identity.            Thank you!     Thank " you for choosing [facility]  for your care. Our goal is always to provide you with excellent care. Hearing back from our patients is one way we can continue to improve our services. Please take a few minutes to complete the written survey that you may receive in the mail after your visit with us. Thank you!             Your Updated Medication List - Protect others around you: Learn how to safely use, store and throw away your medicines at www.disposemymeds.org.          This list is accurate as of 2/1/18  9:18 AM.  Always use your most recent med list.                   Brand Name Dispense Instructions for use Diagnosis    CALCIUM 600+D PO           fish oil-omega-3 fatty acids 1000 MG capsule      Take 6 g by mouth daily        GLUCOSAMINE CHONDR COMPLEX PO           IRON PO           levothyroxine 150 MCG tablet    SYNTHROID/LEVOTHROID    30 tablet    Take 1 tablet (150 mcg) by mouth daily    Postablative hypothyroidism       Multi-vitamin Tabs tablet      Take 1 tablet by mouth daily        PROBIOTIC DAILY PO

## 2018-02-01 NOTE — PATIENT INSTRUCTIONS
1. Labs this afternoon - will check cholesterol, diabetes screen, liver function, kidney function and thyroid  2. You will be contacted to set up the colonoscopy - recommend Dr. Marsh  3. Referral placed for Dermatology  4. Follow-up in 1 year

## 2018-02-01 NOTE — PROGRESS NOTES
"  SUBJECTIVE:   Vonnie Nguyen is a 52 year old female who presents to clinic today for the following health issues:      Hypothyroidism Follow-up      Since last visit, patient describes the following symptoms: Weight stable, no hair loss, no skin changes, no constipation, no loose stools    Patient here for annual follow-up for hypothyroidism. Reports no symptoms related to this.    Perimenopause: has had symptoms of hot flashes, irritability and difficulty sleeping. Periods are irregular. Will go 2-3 months at a time without a period and then become regular again.     Skin lesion: larger lesion on back she would like checked. Has grown. Previously was doing yearly skin checks given number of moles; however, has not had done for about 5 years. Has needed to have precancerous lesions to be frozen on forearms in the past.       Amount of exercise or physical activity: 4-5 days/week for an average of 45-60 minutes    Problems taking medications regularly: No    Medication side effects: none    Diet: regular (no restrictions)    Reviewed and updated as needed this visit by clinical staff  Tobacco  Allergies  Meds  Problems  Med Hx  Surg Hx  Fam Hx  Soc Hx        Reviewed and updated as needed this visit by Provider  Allergies  Meds  Problems       -------------------------------------    ROS:  Constitutional, HEENT, cardiovascular, pulmonary, gi and gu systems are negative, except as otherwise noted.    OBJECTIVE:                                                    /66 (BP Location: Right arm, Patient Position: Sitting, Cuff Size: Adult Regular)  Pulse 61  Temp 97.4  F (36.3  C) (Tympanic)  Ht 5' 7.5\" (1.715 m)  Wt 129 lb 6.4 oz (58.7 kg)  SpO2 99%  BMI 19.97 kg/m2   Body mass index is 19.97 kg/(m^2).  General Appearance: healthy, alert and no distress  Eyes:   no discharge, erythema.  Normal pupils.  Neck: Supple.  No adenopathy, no asymmetry, masses, or scars and thyroid normal to " palpation  Respiratory: lungs clear to auscultation - no rales, rhonchi or wheezes.  Cardiovascular: regular rate and rhythm, normal S1 S2, no S3 or S4 and no murmur, click or rub.  No peripheral edema.  Skin: about 9-10 mm homogenous brown macule over left upper back.  Well perfused and normal turgor.    Diagnostic Test Results:  pending     ASSESSMENT/PLAN:                                                      (E89.0) Postablative hypothyroidism  (primary encounter diagnosis)  Comment: labs pending  Plan: TSH, T4, free  - will send refill of levothyroxine once labs are back    (L98.9) Skin lesion  Comment: Not concerned about larger lesion on back, but should have derm follow-up with precancerous lesions on arms in past  Plan: DERMATOLOGY REFERRAL    (Z13.220) Screening cholesterol level  Comment: has not had done in 5 years  Plan: Lipid panel reflex to direct LDL Fasting    (Z12.11) Screen for colon cancer  Comment: encouraged to set up colonoscopy. Prefers female provider.  Plan: GASTROENTEROLOGY ADULT REF PROCEDURE ONLY    (Z13.1) Screening for diabetes mellitus  Plan: Comprehensive metabolic panel    Follow up with Provider - 1 year and as needed     [facility] MARIAELENA

## 2018-02-01 NOTE — NURSING NOTE
"Chief Complaint   Patient presents with     Thyroid Problem     RECHECK     lipids       Initial /66 (BP Location: Right arm, Patient Position: Sitting, Cuff Size: Adult Regular)  Pulse 61  Temp 97.4  F (36.3  C) (Tympanic)  Ht 5' 7.5\" (1.715 m)  Wt 129 lb 6.4 oz (58.7 kg)  SpO2 99%  BMI 19.97 kg/m2 Estimated body mass index is 19.97 kg/(m^2) as calculated from the following:    Height as of this encounter: 5' 7.5\" (1.715 m).    Weight as of this encounter: 129 lb 6.4 oz (58.7 kg).  Medication Reconciliation: willis Lundy LPN      "

## 2018-02-03 LAB
ALBUMIN SERPL-MCNC: 4 G/DL (ref 3.4–5)
ALP SERPL-CCNC: 36 U/L (ref 40–150)
ALT SERPL W P-5'-P-CCNC: 28 U/L (ref 0–50)
ANION GAP SERPL CALCULATED.3IONS-SCNC: 5 MMOL/L (ref 3–14)
AST SERPL W P-5'-P-CCNC: 24 U/L (ref 0–45)
BILIRUB SERPL-MCNC: 1.8 MG/DL (ref 0.2–1.3)
BUN SERPL-MCNC: 10 MG/DL (ref 7–30)
CALCIUM SERPL-MCNC: 9.1 MG/DL (ref 8.5–10.1)
CHLORIDE SERPL-SCNC: 105 MMOL/L (ref 94–109)
CHOLEST SERPL-MCNC: 204 MG/DL
CO2 SERPL-SCNC: 30 MMOL/L (ref 20–32)
CREAT SERPL-MCNC: 0.84 MG/DL (ref 0.52–1.04)
GFR SERPL CREATININE-BSD FRML MDRD: 71 ML/MIN/1.7M2
GLUCOSE SERPL-MCNC: 84 MG/DL (ref 70–99)
HDLC SERPL-MCNC: 91 MG/DL
LDLC SERPL CALC-MCNC: 102 MG/DL
NONHDLC SERPL-MCNC: 113 MG/DL
POTASSIUM SERPL-SCNC: 4 MMOL/L (ref 3.4–5.3)
PROT SERPL-MCNC: 6.9 G/DL (ref 6.8–8.8)
SODIUM SERPL-SCNC: 140 MMOL/L (ref 133–144)
T4 FREE SERPL-MCNC: 1.23 NG/DL (ref 0.76–1.46)
TRIGL SERPL-MCNC: 54 MG/DL
TSH SERPL DL<=0.005 MIU/L-ACNC: 1.36 MU/L (ref 0.4–4)

## 2018-02-04 RX ORDER — LEVOTHYROXINE SODIUM 150 UG/1
150 TABLET ORAL DAILY
Qty: 90 TABLET | Refills: 3 | Status: SHIPPED | OUTPATIENT
Start: 2018-02-04 | End: 2019-02-26

## 2019-02-26 DIAGNOSIS — E89.0 POSTABLATIVE HYPOTHYROIDISM: ICD-10-CM

## 2019-02-26 NOTE — TELEPHONE ENCOUNTER
"Requested Prescriptions   Pending Prescriptions Disp Refills     levothyroxine (SYNTHROID/LEVOTHROID) 150 MCG tablet [Pharmacy Med Name: LEVOTHYROXINE 0.150MG (150MCG) TAB]    Last Written Prescription Date:  2/4/2018  Last Fill Quantity: 90,  # refills: 3   Last office visit: 2/1/2018 with prescribing provider:  Glo Enriquez     Future Office Visit:     90 tablet 0     Sig: TAKE 1 TABLET(150 MCG) BY MOUTH DAILY    Thyroid Protocol Failed - 2/26/2019  3:29 AM       Failed - Recent (12 mo) or future (30 days) visit within the authorizing provider's specialty    Patient had office visit in the last 12 months or has a visit in the next 30 days with authorizing provider or within the authorizing provider's specialty.  See \"Patient Info\" tab in inbasket, or \"Choose Columns\" in Meds & Orders section of the refill encounter.             Failed - Normal TSH on file in past 12 months    Recent Labs   Lab Test 02/01/18  1339   TSH 1.36             Passed - Patient is 12 years or older       Passed - Medication is active on med list       Passed - No active pregnancy on record    If patient is pregnant or has had a positive pregnancy test, please check TSH.         Passed - No positive pregnancy test in past 12 months    If patient is pregnant or has had a positive pregnancy test, please check TSH.            "

## 2019-02-28 RX ORDER — LEVOTHYROXINE SODIUM 150 UG/1
TABLET ORAL
Qty: 30 TABLET | Refills: 0 | Status: SHIPPED | OUTPATIENT
Start: 2019-02-28 | End: 2019-04-09

## 2019-02-28 NOTE — TELEPHONE ENCOUNTER
30 day supply given.  Patient is due for yearly physical and lab work.  Please call and assist with scheduling appointment prior to next refill   Glo HARRIS RN - Triage  [facility]

## 2019-04-09 DIAGNOSIS — E89.0 POSTABLATIVE HYPOTHYROIDISM: ICD-10-CM

## 2019-04-09 NOTE — TELEPHONE ENCOUNTER
Requested Prescriptions   Pending Prescriptions Disp Refills     levothyroxine (SYNTHROID/LEVOTHROID) 150 MCG tablet 30 tablet 0     Sig: Take 1 tablet (150 mcg) by mouth daily       There is no refill protocol information for this order        Patient scheduled a thyroid check with Dr. Enriquez on 4/17.     Jacki Joe  FWF - TC/FD  4/9/2019 12:41 PM

## 2019-04-11 RX ORDER — LEVOTHYROXINE SODIUM 150 UG/1
150 TABLET ORAL DAILY
Qty: 30 TABLET | Refills: 0 | Status: SHIPPED | OUTPATIENT
Start: 2019-04-11 | End: 2019-04-25

## 2019-04-11 NOTE — TELEPHONE ENCOUNTER
"30 day fill given with up comcing appointment.  Next 5 appointments (look out 90 days)    Apr 17, 2019  8:40 AM CDT  SHORT with Glo Enriquez MD  [facility] [address] [place] [address]  [address]  [place] [address] [phone]        Glo HARRIS RN - Triage  [facility]      Requested Prescriptions   Pending Prescriptions Disp Refills     levothyroxine (SYNTHROID/LEVOTHROID) 150 MCG tablet 30 tablet 0     Sig: Take 1 tablet (150 mcg) by mouth daily       Thyroid Protocol Failed - 4/9/2019 12:41 PM        Failed - Normal TSH on file in past 12 months     Recent Labs   Lab Test 02/01/18  1339   TSH 1.36              Passed - Patient is 12 years or older        Passed - Recent (12 mo) or future (30 days) visit within the authorizing provider's specialty     Patient had office visit in the last 12 months or has a visit in the next 30 days with authorizing provider or within the authorizing provider's specialty.  See \"Patient Info\" tab in inbasket, or \"Choose Columns\" in Meds & Orders section of the refill encounter.              Passed - Medication is active on med list        Passed - No active pregnancy on record     If patient is pregnant or has had a positive pregnancy test, please check TSH.          Passed - No positive pregnancy test in past 12 months     If patient is pregnant or has had a positive pregnancy test, please check TSH.            "

## 2019-04-25 ENCOUNTER — OFFICE VISIT (OUTPATIENT)
Dept: PEDIATRICS | Facility: CLINIC | Age: 54
End: 2019-04-25
Payer: COMMERCIAL

## 2019-04-25 VITALS
SYSTOLIC BLOOD PRESSURE: 98 MMHG | TEMPERATURE: 98.1 F | OXYGEN SATURATION: 98 % | BODY MASS INDEX: 20.39 KG/M2 | HEIGHT: 67 IN | HEART RATE: 66 BPM | WEIGHT: 129.9 LBS | DIASTOLIC BLOOD PRESSURE: 54 MMHG

## 2019-04-25 DIAGNOSIS — R17 ELEVATED BILIRUBIN: ICD-10-CM

## 2019-04-25 DIAGNOSIS — Z12.11 SPECIAL SCREENING FOR MALIGNANT NEOPLASMS, COLON: ICD-10-CM

## 2019-04-25 DIAGNOSIS — Z12.31 VISIT FOR SCREENING MAMMOGRAM: ICD-10-CM

## 2019-04-25 DIAGNOSIS — E89.0 POSTABLATIVE HYPOTHYROIDISM: Primary | ICD-10-CM

## 2019-04-25 DIAGNOSIS — N95.1 MENOPAUSAL SYNDROME (HOT FLASHES): ICD-10-CM

## 2019-04-25 LAB
ALBUMIN SERPL-MCNC: 3.8 G/DL (ref 3.4–5)
ALP SERPL-CCNC: 50 U/L (ref 40–150)
ALT SERPL W P-5'-P-CCNC: 27 U/L (ref 0–50)
AST SERPL W P-5'-P-CCNC: 21 U/L (ref 0–45)
BILIRUB DIRECT SERPL-MCNC: 0.3 MG/DL (ref 0–0.2)
BILIRUB SERPL-MCNC: 0.9 MG/DL (ref 0.2–1.3)
PROT SERPL-MCNC: 7 G/DL (ref 6.8–8.8)
T4 FREE SERPL-MCNC: 1.12 NG/DL (ref 0.76–1.46)
TSH SERPL DL<=0.005 MIU/L-ACNC: 0.27 MU/L (ref 0.4–4)

## 2019-04-25 PROCEDURE — 80076 HEPATIC FUNCTION PANEL: CPT | Performed by: INTERNAL MEDICINE

## 2019-04-25 PROCEDURE — 84443 ASSAY THYROID STIM HORMONE: CPT | Performed by: INTERNAL MEDICINE

## 2019-04-25 PROCEDURE — 36415 COLL VENOUS BLD VENIPUNCTURE: CPT | Performed by: INTERNAL MEDICINE

## 2019-04-25 PROCEDURE — 99214 OFFICE O/P EST MOD 30 MIN: CPT | Performed by: INTERNAL MEDICINE

## 2019-04-25 PROCEDURE — 84439 ASSAY OF FREE THYROXINE: CPT | Performed by: INTERNAL MEDICINE

## 2019-04-25 RX ORDER — LEVOTHYROXINE SODIUM 150 UG/1
150 TABLET ORAL DAILY
Qty: 90 TABLET | Refills: 3 | Status: SHIPPED | OUTPATIENT
Start: 2019-04-25 | End: 2020-03-12

## 2019-04-25 RX ORDER — ESTRADIOL 0.04 MG/D
0.04 PATCH TRANSDERMAL
Qty: 4 PATCH | Refills: 11 | Status: SHIPPED | OUTPATIENT
Start: 2019-04-25 | End: 2020-09-21

## 2019-04-25 ASSESSMENT — MIFFLIN-ST. JEOR: SCORE: 1225.81

## 2019-04-25 NOTE — PROGRESS NOTES
"  SUBJECTIVE:   Vonnie Nguyen is a 53 year old female who presents to clinic today for the following health issues:    HPI  Hypothyroidism Follow-up      Since last visit, patient describes the following symptoms: Weight stable, no hair loss, no skin changes, no constipation, no loose stools    Menopause: Periods still irregular. Last in October, but very little. Hot flashes still there, but less intense. Wakes at night and not getting good sleep. Has had multiple friends start on hormones and said it was the best thing they had done. Not sure if she should consider starting them or not. Feels like she is missing out on things because she is so tired from not sleeping well. No personal or family history of breast cancer or blood clots. No history of heart disease.    Additional history: as documented    Reviewed and updated as needed this visit by clinical staff  Tobacco  Allergies  Meds  Problems  Med Hx  Surg Hx  Fam Hx  Soc Hx        Reviewed and updated as needed this visit by Provider  Tobacco  Allergies  Meds  Problems  Med Hx  Surg Hx  Fam Hx       -------------------------------------    ROS:  Constitutional, HEENT, cardiovascular, pulmonary, gi and gu systems are negative, except as otherwise noted.    OBJECTIVE:                                                    BP 98/54 (BP Location: Right arm, Patient Position: Sitting, Cuff Size: Adult Regular)   Pulse 66   Temp 98.1  F (36.7  C) (Tympanic)   Ht 1.708 m (5' 7.25\")   Wt 58.9 kg (129 lb 14.4 oz)   LMP 10/25/2018 (Approximate)   SpO2 98%   BMI 20.19 kg/m     Body mass index is 20.19 kg/m .  General Appearance: healthy, alert and no distress  Eyes:   no discharge, erythema.  Normal pupils.  Neck: Supple.  No adenopathy, no asymmetry, masses, or scars and thyroid normal to palpation  Respiratory: lungs clear to auscultation - no rales, rhonchi or wheezes.  Cardiovascular: regular rate and rhythm, normal S1 S2, no S3 or S4 and no murmur, " click or rub.  No peripheral edema.  Skin: no rashes or lesions.  Well perfused and normal turgor.    Diagnostic Test Results:  Results for orders placed or performed in visit on 04/25/19 (from the past 24 hour(s))   TSH   Result Value Ref Range    TSH 0.27 (L) 0.40 - 4.00 mU/L   T4, free   Result Value Ref Range    T4 Free 1.12 0.76 - 1.46 ng/dL   Hepatic panel   Result Value Ref Range    Bilirubin Direct 0.3 (H) 0.0 - 0.2 mg/dL    Bilirubin Total 0.9 0.2 - 1.3 mg/dL    Albumin 3.8 3.4 - 5.0 g/dL    Protein Total 7.0 6.8 - 8.8 g/dL    Alkaline Phosphatase 50 40 - 150 U/L    ALT 27 0 - 50 U/L    AST 21 0 - 45 U/L        ASSESSMENT/PLAN:                                                      (E89.0) Postablative hypothyroidism  (primary encounter diagnosis)  Comment: TSH slightly suppressed, but free T4 normal. Suspect normal variation.  Plan: TSH, T4, free, levothyroxine         (SYNTHROID/LEVOTHROID) 150 MCG tablet, TSH, T4,        free  - continue levothyroxine at 150 mcg once a day  - recheck levels in 3 months as lab appointment.    (N95.1) Menopausal syndrome (hot flashes)  Comment: impacting sleep. Discussed options and risks at length.  Plan: estradiol (CLIMARA) 0.0375 MG/24HR weekly         patch, progesterone (PROMETRIUM) 100 MG capsule  - discussed using hormones at the smallest amount for the shortest period of time  - will start estradiol patch at 0.0375 mg dose along with daily prometrium 100 mg   - if not controlled, can increase patch to 0.05 mg - will call    (R17) Elevated bilirubin  Comment: elevated in past. Now direct slightly elevated, but total is ok. Likely genetic. Other LFTs normal.  Plan: Hepatic panel  - recheck in 1 year    (Z12.11) Special screening for malignant neoplasms, colon  Plan: Fecal colorectal cancer screen (FIT)    (Z12.31) Visit for screening mammogram  Plan: [place] Screen Bilateral w/Tommy, CANCELED: MA         Screening Digital Bilateral    FUTURE APPOINTMENTS:       - Follow-up  visit in 1 year    [facility] MARIAELENA

## 2019-04-25 NOTE — PATIENT INSTRUCTIONS
1. Labs today: liver function, thyroid function  2. Return stool test  3. Start estrogen patch once a week and progesterone pill once a day  - if not enough, could switch to a higher estrogen amount - let me know  4. Schedule mammogram  5. Follow-up in 1 year

## 2019-10-05 ENCOUNTER — HEALTH MAINTENANCE LETTER (OUTPATIENT)
Age: 54
End: 2019-10-05

## 2020-03-12 ENCOUNTER — MYC MEDICAL ADVICE (OUTPATIENT)
Dept: PEDIATRICS | Facility: CLINIC | Age: 55
End: 2020-03-12

## 2020-09-20 ENCOUNTER — VIRTUAL VISIT (OUTPATIENT)
Dept: FAMILY MEDICINE | Facility: OTHER | Age: 55
End: 2020-09-20
Payer: COMMERCIAL

## 2020-09-20 PROCEDURE — 99421 OL DIG E/M SVC 5-10 MIN: CPT | Performed by: PHYSICIAN ASSISTANT

## 2020-09-21 DIAGNOSIS — Z20.822 SUSPECTED 2019 NOVEL CORONAVIRUS INFECTION: Primary | ICD-10-CM

## 2020-09-21 NOTE — PROGRESS NOTES
"Vonnie Nguyen is a 55 year old female who is being evaluated via a billable telephone visit.      The patient has been notified of following:     \"This telephone visit will be conducted via a call between you and your physician/provider. We have found that certain health care needs can be provided without the need for a physical exam.  This service lets us provide the care you need with a short phone conversation.  If a prescription is necessary we can send it directly to your pharmacy.  If lab work is needed we can place an order for that and you can then stop by our lab to have the test done at a later time.    Telephone visits are billed at different rates depending on your insurance coverage. During this emergency period, for some insurers they may be billed the same as an in-person visit.  Please reach out to your insurance provider with any questions.    If during the course of the call the physician/provider feels a telephone visit is not appropriate, you will not be charged for this service.\"    Patient has given verbal consent for Telephone visit?  Yes    What phone number would you like to be contacted at? [phone]    How would you like to obtain your AVS? Ramón    Subjective     Vonnie Nguyen is a 55 year old female who presents via phone visit today for the following health issues:    Remind to get colonoscopy -  FIT test  Labs ordered 9/14/20 - FIT, CMP, TSH  Mammogram scheduled for 10/6/20  Asked to go to pharmacy for shingles and flu vaccines  PHQ2 done    Has COVID test scheduled for tomorrow - son positive - had to reschedule lab testing until after result  History of Present Illness        Hypothyroidism:     Since last visit, patient describes the following symptoms::  None   Needs refills - will be out in 1 week        Review of Systems   Constitutional, HEENT, cardiovascular, pulmonary, gi and gu systems are negative, except as otherwise noted.       "     Assessment/Plan:    Assessment & Plan   Postablative hypothyroidism  Asymptomatic. Has lab visit due.   - levothyroxine (SYNTHROID/LEVOTHROID) 150 MCG tablet; Take 1 tablet (150 mcg) by mouth daily       [facility] MARIAELENA    Phone call duration: 9 minutes

## 2020-09-21 NOTE — PROGRESS NOTES
"Date: 2020 22:48:23  Clinician: Bryan Miles  Clinician NPI: [id number]  Patient: Vonnie Nguyen  Patient : 1965  Patient Address: [address] [place] [address]  Patient Phone: [phone]  Visit Protocol: URI  Patient Summary:  Vonnie is a 55 year old ( : 1965 ) female who initiated a [facility] Visit for COVID-19 (Coronavirus) evaluation and screening. When asked the question \"Please sign me up to receive news, health information and promotions from [facility].\", Vonnie responded \"Yes\".    Vonnie states her symptoms started suddenly 3-4 days ago.   Her symptoms consist of malaise, a sore throat, and myalgia.   Symptom details   Sore throat: Vonnie reports having mild throat pain (1-3 on a 10 point pain scale), does not have exudate on her tonsils, and can swallow liquids. The lymph nodes in her neck are not enlarged. A rash has not appeared on the skin since the sore throat started.    Vonnie denies having chills, facial pain or pressure, fever, teeth pain, ageusia, diarrhea, cough, nasal congestion, headache, ear pain, anosmia, vomiting, rhinitis, nausea, wheezing, and enlarged lymph nodes. She also denies double sickening (worsening symptoms after initial improvement), taking antibiotic medication in the past month, and having recent facial or sinus surgery in the past 60 days. She is not experiencing dyspnea.   Precipitating events  Within the past week, Vonnie has not been exposed to someone with strep throat. She has not recently been exposed to someone with influenza. Vonnie has not been in close contact with any high risk individuals.   Pertinent COVID-19 (Coronavirus) information  In the past 14 days, Vonnie has not worked in a congregate living setting.   She does not work or volunteer as healthcare worker or a  and does not work or volunteer in a healthcare facility.   Vonnie also has not lived in a congregate living setting in the past 14 days. She does not live with a " healthcare worker.   Vonnie has had a close contact with a laboratory-confirmed COVID-19 patient within 14 days of symptom onset. Additional information about contact with COVID-19 (Coronavirus) patient as reported by the patient (free text): My 16 year old son, who lives with me, tested positive 9/19/20.   Since December 2019, Vonnie and has not had upper respiratory infection or influenza-like illness. Has not been diagnosed with lab-confirmed COVID-19 test   Pertinent medical history  Vonnie does not get yeast infections when she takes antibiotics.   Vonnie does not need a return to work/school note.   Weight: 125 lbs   Vonnie does not smoke or use smokeless tobacco.   Weight: 125 lbs    MEDICATIONS: calcium carbonate oral, Vitamin D3 oral, Complete Multivitamin-Multimineral oral, levothyroxine oral, ALLERGIES: NKDA  Clinician Response:  Dear Vonnie,  Based on the information provided, you have viral pharyngitis. This is a sore throat caused by a virus and is usually the first sign of a cold. Your sore throat should resolve in a couple days as other cold symptoms develop.  Unfortunately, there are no medications that can cure a cold, so treatment is focused on controlling symptoms as much as possible until you recover. Most people gradually feel better in 1-2 weeks.  Medication information  Because you have a viral infection, antibiotics will not help you get better. Treating a viral infection with antibiotics could actually make you feel worse.  I am prescribing:     Ibuprofen 800 mg oral tablet. Take 1 tablet by mouth 3 times per day as needed for 7 days. Do not exceed 2400mg in 24 hours. There are no refills with this prescription.   Unless you are allergic to the over-the-counter medication(s) below, I recommend using:       Acetaminophen (Tylenol or store brand) oral tablet. Take 1-2 tablets by mouth every 4-6 hours to help with the discomfort.      Ibuprofen (Advil or store brand) 200 mg oral tablet. Take 1-3 tablets  (200-600 mg) by mouth every 8 hours to help with the discomfort. Make sure to take the ibuprofen with food. Do not exceed 2400 mg in 24 hours.     Over-the-counter medications do not require a prescription. Ask the pharmacist if you have any questions.  Self care  Steps you can take to be as comfortable as possible:     Rest.    Drink plenty of fluids.    Use throat lozenges.    Suck on frozen items such as popsicles.    Drink hot tea with lemon and honey.    Gargle with warm salt water (1/4 teaspoon of salt per 8 ounce glass of water).     When to seek care  Please be seen in a clinic or urgent care if any of the following occur:   New symptoms develop, or symptoms become worse   Call ahead before going to the clinic or urgent care.  Call 911 or go to the emergency room if you feel that your throat is closing off, you suddenly develop a rash, you are unable to swallow fluids, you are drooling, or you are having difficulty breathing.  Additional treatment plan   Your symptoms show that you may have coronavirus (COVID-19). This illness can cause fever, cough and trouble breathing. Many people get a mild case and get better on their own. Some people can get very sick.  What should I do?  We would like to test you for this virus.   1. Please call [phone] to schedule your visit. Explain that you were referred by [facility] to have a COVID-19 test. Be ready to share your [facility] visit ID number.  The following will serve as your written order for this COVID Test, ordered by me, for the indication of suspected COVID [Z20.828]: The test will be ordered in [facility], our electronic health record, after you are scheduled. It will show as ordered and authorized by Joshua Lockett MD.  Order: COVID-19 (Coronavirus) PCR for SYMPTOMATIC testing from [facility].      2. When it's time for your COVID test:  Stay at least 6 feet away from others. (If someone will drive you to your test, stay in the backseat, as far away from the  as you  "can.)   Cover your mouth and nose with a mask, tissue or washcloth.  Go straight to the testing site. Don't make any stops on the way there or back.      3.Starting now: Stay home and away from others (self-isolate) until:   You've had no fever---and no medicine that reduces fever---for one full day (24 hours). And...   Your other symptoms have gotten better. For example, your cough or breathing has improved. And...   At least 10 days have passed since your symptoms started.       During this time, don't leave the house except for testing or medical care.   Stay in your own room, even for meals. Use your own bathroom if you can.   Stay away from others in your home. No hugging, kissing or shaking hands. No visitors.  Don't go to work, school or anywhere else.    Clean \"high touch\" surfaces often (doorknobs, counters, handles, etc.). Use a household cleaning spray or wipes. You'll find a full list of  on the EPA website: www.epa.gov/pesticide-registration/list-n-disinfectants-use-against-sars-cov-2.   Cover your mouth and nose with a mask, tissue or washcloth to avoid spreading germs.  Wash your hands and face often. Use soap and water.  Caregivers in these groups are at risk for severe illness due to COVID-19:  o People 65 years and older  o People who live in a nursing home or long-term care facility  o People with chronic disease (lung, heart, cancer, diabetes, kidney, liver, immunologic)  o People who have a weakened immune system, including those who:   Are in cancer treatment  Take medicine that weakens the immune system, such as corticosteroids  Had a bone marrow or organ transplant  Have an immune deficiency  Have poorly controlled HIV or AIDS  Are obese (body mass index of 40 or higher)  Smoke regularly   o Caregivers should wear gloves while washing dishes, handling laundry and cleaning bedrooms and bathrooms.  o Use caution when washing and drying laundry: Don't shake dirty laundry, and use the " warmest water setting that you can.  o For more tips, go to www.cdc.gov/coronavirus/2019-ncov/downloads/10Things.pdf.    4.Sign up for [employer]. We know it's scary to hear that you might have COVID-19. We want to track your symptoms to make sure you're okay over the next 2 weeks. Please look for an email from [employer]---this is a free, online program that we'll use to keep in touch. To sign up, follow the link in the email. Learn more at [email or web address]  How can I take care of myself?   Get lots of rest. Drink extra fluids (unless a doctor has told you not to).   Take Tylenol (acetaminophen) for fever or pain. If you have liver or kidney problems, ask your family doctor if it's okay to take Tylenol.   Adults can take either:    650 mg (two 325 mg pills) every 4 to 6 hours, or...   1,000 mg (two 500 mg pills) every 8 hours as needed.    Note: Don't take more than 3,000 mg in one day. Acetaminophen is found in many medicines (both prescribed and over-the-counter medicines). Read all labels to be sure you don't take too much.   For children, check the Tylenol bottle for the right dose. The dose is based on the child's age or weight.    If you have other health problems (like cancer, heart failure, an organ transplant or severe kidney disease): Call your specialty clinic if you don't feel better in the next 2 days.       Know when to call 911. Emergency warning signs include:    Trouble breathing or shortness of breath Pain or pressure in the chest that doesn't go away Feeling confused like you haven't felt before, or not being able to wake up Bluish-colored lips or face.  Where can I get more information?    [facility] -- About COVID-19: [email or web address]   CDC -- What to Do If You're Sick: www.cdc.gov/coronavirus/2019-ncov/about/steps-when-sick.html   CDC -- Ending Home Isolation: www.cdc.gov/coronavirus/2019-ncov/hcp/disposition-in-home-patients.html   CDC -- Caring for  Someone: www.cdc.gov/coronavirus/2019-ncov/if-you-are-sick/care-for-someone.html   [facility] -- Interim Guidance for Hospital Discharge to Home: www.health.[facility].[place].us/diseases/coronavirus/hcp/hospdischarge.pdf   [facility] clinical trials (COVID-19 research studies): clinicalaffairs.[facility]-clinical-trials    Below are the COVID-19 hotlines at the [place] Department of Health ([facility]). Interpreters are available.    For health questions: Call 514-425-1484 or 1-555.701.1798 (7 a.m. to 7 p.m.) For questions about schools and childcare: Call 026-498-6941 or 1-126.607.4929 (7 a.m. to 7 p.m.)    COVID-19 (Coronavirus) General Information  Because there is currently no vaccine to prevent infection, the best way to protect yourself is to avoid being exposed to this virus. Common symptoms of COVID-19 include but are not limited to fever, cough, and shortness of breath. These symptoms appear 2-14 days after you are exposed to the virus that causes COVID-19. Click here for more information from the CDC on how to protect yourself.  If you are sick with COVID-19 or suspect you are infected with the virus that causes COVID-19, follow the steps here from the CDC to help prevent the disease from spreading to people in your home and community.  Click here for general information from the CDC on testing.  If you develop any of these emergency warning signs for COVID-19, get medical attention immediately:     Trouble breathing    Persistent pain or pressure in the chest    New confusion or inability to arouse    Bluish lips or face      Call your doctor or clinic before going in. Call 911 if you have a medical emergency and notify the  you have or think you may have COVID-19.  For more detailed and up to date information on COVID-19 (Coronavirus), please visit the CDC website.   Diagnosis: COVID-19  Diagnosis ICD: U07.1  Prescription: ibuprofen (IBU) 800 mg oral tablet 21 tablet, 7 days supply. Take 1 tablet by  mouth 3 times per day as needed for 7 days. Refills: 0, Refill as needed: no, Allow substitutions: yes

## 2020-09-22 ENCOUNTER — VIRTUAL VISIT (OUTPATIENT)
Dept: PEDIATRICS | Facility: CLINIC | Age: 55
End: 2020-09-22
Payer: COMMERCIAL

## 2020-09-22 DIAGNOSIS — E89.0 POSTABLATIVE HYPOTHYROIDISM: Primary | ICD-10-CM

## 2020-09-22 PROCEDURE — 99213 OFFICE O/P EST LOW 20 MIN: CPT | Mod: 95 | Performed by: NURSE PRACTITIONER

## 2020-09-22 RX ORDER — LEVOTHYROXINE SODIUM 150 UG/1
150 TABLET ORAL DAILY
Qty: 90 TABLET | Refills: 3 | Status: SHIPPED | OUTPATIENT
Start: 2020-09-22 | End: 2020-10-15

## 2020-09-23 DIAGNOSIS — Z20.822 SUSPECTED 2019 NOVEL CORONAVIRUS INFECTION: ICD-10-CM

## 2020-09-23 PROCEDURE — U0003 INFECTIOUS AGENT DETECTION BY NUCLEIC ACID (DNA OR RNA); SEVERE ACUTE RESPIRATORY SYNDROME CORONAVIRUS 2 (SARS-COV-2) (CORONAVIRUS DISEASE [COVID-19]), AMPLIFIED PROBE TECHNIQUE, MAKING USE OF HIGH THROUGHPUT TECHNOLOGIES AS DESCRIBED BY CMS-2020-01-R: HCPCS | Performed by: FAMILY MEDICINE

## 2020-09-24 LAB
SARS-COV-2 RNA SPEC QL NAA+PROBE: NOT DETECTED
SPECIMEN SOURCE: NORMAL

## 2020-10-14 DIAGNOSIS — Z00.00 HEALTHCARE MAINTENANCE: ICD-10-CM

## 2020-10-14 PROCEDURE — 80053 COMPREHEN METABOLIC PANEL: CPT | Performed by: NURSE PRACTITIONER

## 2020-10-14 PROCEDURE — 84443 ASSAY THYROID STIM HORMONE: CPT | Performed by: NURSE PRACTITIONER

## 2020-10-14 PROCEDURE — 84439 ASSAY OF FREE THYROXINE: CPT | Performed by: NURSE PRACTITIONER

## 2020-10-14 PROCEDURE — 36415 COLL VENOUS BLD VENIPUNCTURE: CPT | Performed by: NURSE PRACTITIONER

## 2020-10-15 DIAGNOSIS — E89.0 POSTABLATIVE HYPOTHYROIDISM: Primary | ICD-10-CM

## 2020-10-15 LAB
ALBUMIN SERPL-MCNC: 3.8 G/DL (ref 3.4–5)
ALP SERPL-CCNC: 47 U/L (ref 40–150)
ALT SERPL W P-5'-P-CCNC: 31 U/L (ref 0–50)
ANION GAP SERPL CALCULATED.3IONS-SCNC: 6 MMOL/L (ref 3–14)
AST SERPL W P-5'-P-CCNC: 21 U/L (ref 0–45)
BILIRUB SERPL-MCNC: 1.2 MG/DL (ref 0.2–1.3)
BUN SERPL-MCNC: 19 MG/DL (ref 7–30)
CALCIUM SERPL-MCNC: 9.3 MG/DL (ref 8.5–10.1)
CHLORIDE SERPL-SCNC: 102 MMOL/L (ref 94–109)
CO2 SERPL-SCNC: 29 MMOL/L (ref 20–32)
CREAT SERPL-MCNC: 0.74 MG/DL (ref 0.52–1.04)
GFR SERPL CREATININE-BSD FRML MDRD: >90 ML/MIN/{1.73_M2}
GLUCOSE SERPL-MCNC: 112 MG/DL (ref 70–99)
POTASSIUM SERPL-SCNC: 3.4 MMOL/L (ref 3.4–5.3)
PROT SERPL-MCNC: 7 G/DL (ref 6.8–8.8)
SODIUM SERPL-SCNC: 137 MMOL/L (ref 133–144)
T4 FREE SERPL-MCNC: 1.43 NG/DL (ref 0.76–1.46)
TSH SERPL DL<=0.005 MIU/L-ACNC: 0.27 MU/L (ref 0.4–4)

## 2020-10-15 RX ORDER — LEVOTHYROXINE SODIUM 137 UG/1
137 TABLET ORAL DAILY
Qty: 90 TABLET | Refills: 0 | Status: SHIPPED | OUTPATIENT
Start: 2020-10-15 | End: 2021-01-27 | Stop reason: DRUGHIGH

## 2020-11-02 DIAGNOSIS — Z00.00 HEALTHCARE MAINTENANCE: ICD-10-CM

## 2020-11-14 ENCOUNTER — HEALTH MAINTENANCE LETTER (OUTPATIENT)
Age: 55
End: 2020-11-14

## 2020-12-18 DIAGNOSIS — E89.0 POSTABLATIVE HYPOTHYROIDISM: ICD-10-CM

## 2020-12-18 PROCEDURE — 36415 COLL VENOUS BLD VENIPUNCTURE: CPT | Performed by: NURSE PRACTITIONER

## 2020-12-18 PROCEDURE — 84443 ASSAY THYROID STIM HORMONE: CPT | Performed by: NURSE PRACTITIONER

## 2020-12-18 PROCEDURE — 84439 ASSAY OF FREE THYROXINE: CPT | Performed by: NURSE PRACTITIONER

## 2020-12-19 LAB
T4 FREE SERPL-MCNC: 1.1 NG/DL (ref 0.76–1.46)
TSH SERPL DL<=0.005 MIU/L-ACNC: 0.22 MU/L (ref 0.4–4)

## 2020-12-21 DIAGNOSIS — E89.0 POSTABLATIVE HYPOTHYROIDISM: Primary | ICD-10-CM

## 2020-12-21 RX ORDER — LEVOTHYROXINE SODIUM 125 UG/1
125 TABLET ORAL DAILY
Qty: 60 TABLET | Refills: 0 | Status: SHIPPED | OUTPATIENT
Start: 2020-12-21 | End: 2021-01-27

## 2021-01-25 DIAGNOSIS — E89.0 POSTABLATIVE HYPOTHYROIDISM: ICD-10-CM

## 2021-01-25 LAB — TSH SERPL DL<=0.005 MIU/L-ACNC: 0.53 MU/L (ref 0.4–4)

## 2021-01-25 PROCEDURE — 36415 COLL VENOUS BLD VENIPUNCTURE: CPT | Performed by: NURSE PRACTITIONER

## 2021-01-25 PROCEDURE — 84443 ASSAY THYROID STIM HORMONE: CPT | Performed by: NURSE PRACTITIONER

## 2021-01-27 RX ORDER — LEVOTHYROXINE SODIUM 125 UG/1
125 TABLET ORAL DAILY
Qty: 90 TABLET | Refills: 1 | Status: SHIPPED | OUTPATIENT
Start: 2021-01-27 | End: 2021-07-28

## 2021-07-27 DIAGNOSIS — E89.0 POSTABLATIVE HYPOTHYROIDISM: ICD-10-CM

## 2021-07-28 RX ORDER — LEVOTHYROXINE SODIUM 125 UG/1
TABLET ORAL
Qty: 90 TABLET | Refills: 0 | Status: SHIPPED | OUTPATIENT
Start: 2021-07-28 | End: 2021-11-15

## 2021-09-12 ENCOUNTER — HEALTH MAINTENANCE LETTER (OUTPATIENT)
Age: 56
End: 2021-09-12

## 2021-11-15 ENCOUNTER — VIRTUAL VISIT (OUTPATIENT)
Dept: PEDIATRICS | Facility: CLINIC | Age: 56
End: 2021-11-15
Payer: COMMERCIAL

## 2021-11-15 DIAGNOSIS — R79.89 ELEVATED LIVER FUNCTION TESTS: ICD-10-CM

## 2021-11-15 DIAGNOSIS — Z12.11 SCREEN FOR COLON CANCER: Primary | ICD-10-CM

## 2021-11-15 DIAGNOSIS — E89.0 POSTABLATIVE HYPOTHYROIDISM: ICD-10-CM

## 2021-11-15 PROCEDURE — 99213 OFFICE O/P EST LOW 20 MIN: CPT | Mod: 95 | Performed by: NURSE PRACTITIONER

## 2021-11-15 RX ORDER — LEVOTHYROXINE SODIUM 125 UG/1
TABLET ORAL
Qty: 90 TABLET | Refills: 3 | Status: SHIPPED | OUTPATIENT
Start: 2021-11-15 | End: 2022-11-18

## 2021-11-15 NOTE — PROGRESS NOTES
Vonnie is a 56 year old who is being evaluated via a billable video visit.      How would you like to obtain your AVS? MyChart  If the video visit is dropped, the invitation should be resent by: Text to cell phone: [phone]  Will anyone else be joining your video visit? No    Video Start Time: 0139    Assessment & Plan     (Z12.11) Screen for colon cancer  (primary encounter diagnosis)  Plan: REVIEW OF HEALTH MAINTENANCE PROTOCOL ORDERS,         CAM([employer])    (E89.0) Postablative hypothyroidism  Comment: Feeling better on current dose  Plan: levothyroxine (SYNTHROID/LEVOTHROID) 125 MCG         tablet, TSH with free T4 reflex      (R79.89) Elevated liver function tests  Comment: Likely eve's. Asymptomatic. Pt wants to recheck  Plan: Comprehensive metabolic panel (BMP + Alb, Alk         Phos, ALT, AST, Total. Bili, TP)    No follow-ups on file.    Tanesha Stockton, CAMDEN CNP  [facility]    Subjective   Vonnie is a 56 year old who presents for the following health issues     History of Present Illness       Hypothyroidism:     Since last visit, patient describes the following symptoms::  None    She eats 4 or more servings of fruits and vegetables daily.She consumes 0 sweetened beverage(s) daily.She exercises with enough effort to increase her heart rate 30 to 60 minutes per day.  She exercises with enough effort to increase her heart rate 5 days per week.   She is taking medications regularly.       Review of Systems   Constitutional, HEENT, cardiovascular, pulmonary, gi and gu systems are negative, except as otherwise noted.      Objective           Vitals:  No vitals were obtained today due to virtual visit.    Physical Exam   GENERAL: Healthy, alert and no distress  PSYCH: Mentation appears normal, affect normal/bright, judgement and insight intact, normal speech and appearance well-groomed.  Video-Visit Details    Type of service:  Video Visit    Video End Time:1:41  PM    Originating Location (pt. Location): Home    Distant Location (provider location):  [facility] [place] [facility]     Platform used for Video Visit: [employer]

## 2021-11-20 ENCOUNTER — LAB (OUTPATIENT)
Dept: LAB | Facility: CLINIC | Age: 56
End: 2021-11-20
Payer: COMMERCIAL

## 2021-11-20 DIAGNOSIS — E89.0 POSTABLATIVE HYPOTHYROIDISM: ICD-10-CM

## 2021-11-20 DIAGNOSIS — R79.89 ELEVATED LIVER FUNCTION TESTS: ICD-10-CM

## 2021-11-20 PROCEDURE — 36415 COLL VENOUS BLD VENIPUNCTURE: CPT

## 2021-11-20 PROCEDURE — 84443 ASSAY THYROID STIM HORMONE: CPT

## 2021-11-20 PROCEDURE — 80053 COMPREHEN METABOLIC PANEL: CPT

## 2021-11-21 LAB
ALBUMIN SERPL-MCNC: 4.1 G/DL (ref 3.4–5)
ALP SERPL-CCNC: 53 U/L (ref 40–150)
ALT SERPL W P-5'-P-CCNC: 33 U/L (ref 0–50)
ANION GAP SERPL CALCULATED.3IONS-SCNC: 4 MMOL/L (ref 3–14)
AST SERPL W P-5'-P-CCNC: 24 U/L (ref 0–45)
BILIRUB SERPL-MCNC: 1.9 MG/DL (ref 0.2–1.3)
BUN SERPL-MCNC: 12 MG/DL (ref 7–30)
CALCIUM SERPL-MCNC: 9.2 MG/DL (ref 8.5–10.1)
CHLORIDE BLD-SCNC: 100 MMOL/L (ref 94–109)
CO2 SERPL-SCNC: 30 MMOL/L (ref 20–32)
CREAT SERPL-MCNC: 0.98 MG/DL (ref 0.52–1.04)
GFR SERPL CREATININE-BSD FRML MDRD: 64 ML/MIN/1.73M2
GLUCOSE BLD-MCNC: 99 MG/DL (ref 70–99)
POTASSIUM BLD-SCNC: 4 MMOL/L (ref 3.4–5.3)
PROT SERPL-MCNC: 7.4 G/DL (ref 6.8–8.8)
SODIUM SERPL-SCNC: 134 MMOL/L (ref 133–144)
TSH SERPL DL<=0.005 MIU/L-ACNC: 1.05 MU/L (ref 0.4–4)

## 2021-11-22 PROBLEM — R17 ELEVATED BILIRUBIN: Status: ACTIVE | Noted: 2021-11-22

## 2021-11-26 ENCOUNTER — HOSPITAL ENCOUNTER (OUTPATIENT)
Dept: MAMMOGRAPHY | Facility: CLINIC | Age: 56
Discharge: HOME | End: 2021-11-26
Attending: NURSE PRACTITIONER | Admitting: NURSE PRACTITIONER
Payer: COMMERCIAL

## 2021-11-26 DIAGNOSIS — Z12.31 VISIT FOR SCREENING MAMMOGRAM: ICD-10-CM

## 2021-11-26 PROCEDURE — 77063 BREAST TOMOSYNTHESIS BI: CPT

## 2022-01-02 ENCOUNTER — HEALTH MAINTENANCE LETTER (OUTPATIENT)
Age: 57
End: 2022-01-02

## 2022-11-17 ENCOUNTER — HOSPITAL ENCOUNTER (OUTPATIENT)
Dept: MAMMOGRAPHY | Facility: CLINIC | Age: 57
Discharge: HOME | End: 2022-11-17
Attending: NURSE PRACTITIONER | Admitting: NURSE PRACTITIONER
Payer: COMMERCIAL

## 2022-11-17 DIAGNOSIS — Z12.31 VISIT FOR SCREENING MAMMOGRAM: ICD-10-CM

## 2022-11-17 PROCEDURE — 77067 SCR MAMMO BI INCL CAD: CPT

## 2022-11-19 ENCOUNTER — HEALTH MAINTENANCE LETTER (OUTPATIENT)
Age: 57
End: 2022-11-19

## 2023-01-10 ENCOUNTER — LAB (OUTPATIENT)
Dept: LAB | Facility: CLINIC | Age: 58
End: 2023-01-10
Payer: COMMERCIAL

## 2023-01-10 DIAGNOSIS — E03.9 HYPOTHYROIDISM: Primary | ICD-10-CM

## 2023-01-10 DIAGNOSIS — Z13.220 SCREENING FOR HYPERLIPIDEMIA: ICD-10-CM

## 2023-01-10 LAB
ALBUMIN SERPL BCG-MCNC: 4.5 G/DL (ref 3.5–5.2)
ALP SERPL-CCNC: 49 U/L (ref 35–104)
ALT SERPL W P-5'-P-CCNC: 20 U/L (ref 10–35)
AST SERPL W P-5'-P-CCNC: 23 U/L (ref 10–35)
BILIRUB DIRECT SERPL-MCNC: 0.25 MG/DL (ref 0–0.3)
BILIRUB SERPL-MCNC: 1 MG/DL
CHOLEST SERPL-MCNC: 239 MG/DL
HDLC SERPL-MCNC: 113 MG/DL
LDLC SERPL CALC-MCNC: 117 MG/DL
NONHDLC SERPL-MCNC: 126 MG/DL
PROT SERPL-MCNC: 7 G/DL (ref 6.4–8.3)
TRIGL SERPL-MCNC: 45 MG/DL
TSH SERPL DL<=0.005 MIU/L-ACNC: 2.73 UIU/ML (ref 0.3–4.2)

## 2023-01-10 PROCEDURE — 80061 LIPID PANEL: CPT

## 2023-01-10 PROCEDURE — 36415 COLL VENOUS BLD VENIPUNCTURE: CPT

## 2023-01-10 PROCEDURE — 84443 ASSAY THYROID STIM HORMONE: CPT

## 2023-01-10 PROCEDURE — 80076 HEPATIC FUNCTION PANEL: CPT

## 2023-01-24 ENCOUNTER — OFFICE VISIT (OUTPATIENT)
Dept: PEDIATRICS | Facility: CLINIC | Age: 58
End: 2023-01-24
Payer: COMMERCIAL

## 2023-01-24 VITALS
BODY MASS INDEX: 20.2 KG/M2 | OXYGEN SATURATION: 99 % | SYSTOLIC BLOOD PRESSURE: 124 MMHG | RESPIRATION RATE: 16 BRPM | DIASTOLIC BLOOD PRESSURE: 66 MMHG | HEIGHT: 67 IN | HEART RATE: 56 BPM | WEIGHT: 128.7 LBS | TEMPERATURE: 97.2 F

## 2023-01-24 DIAGNOSIS — Z00.00 HEALTHCARE MAINTENANCE: Primary | ICD-10-CM

## 2023-01-24 DIAGNOSIS — E89.0 POSTABLATIVE HYPOTHYROIDISM: ICD-10-CM

## 2023-01-24 DIAGNOSIS — Z12.11 SCREEN FOR COLON CANCER: ICD-10-CM

## 2023-01-24 PROCEDURE — 99396 PREV VISIT EST AGE 40-64: CPT | Mod: 25 | Performed by: NURSE PRACTITIONER

## 2023-01-24 PROCEDURE — 90682 RIV4 VACC RECOMBINANT DNA IM: CPT | Performed by: NURSE PRACTITIONER

## 2023-01-24 PROCEDURE — 90471 IMMUNIZATION ADMIN: CPT | Performed by: NURSE PRACTITIONER

## 2023-01-24 RX ORDER — MULTIVITAMIN WITH IRON
TABLET ORAL
COMMUNITY
Start: 2020-01-01 | End: 2024-04-30 | Stop reason: SINTOL

## 2023-01-24 RX ORDER — LEVOTHYROXINE SODIUM 125 UG/1
125 TABLET ORAL DAILY
Qty: 90 TABLET | Refills: 3 | Status: SHIPPED | OUTPATIENT
Start: 2023-01-24 | End: 2024-01-25

## 2023-01-24 RX ORDER — MULTIVITAMIN WITH IRON
TABLET ORAL
COMMUNITY
Start: 2020-01-01

## 2023-01-24 ASSESSMENT — PAIN SCALES - GENERAL: PAINLEVEL: NO PAIN (0)

## 2023-01-24 NOTE — PROGRESS NOTES
"  Assessment & Plan     (Z00.00) Healthcare maintenance  (primary encounter diagnosis)  -Mammo/paps thru OB  -Declines zoster    (E89.0) Postablative hypothyroidism  Plan: levothyroxine (SYNTHROID/LEVOTHROID) 125 MCG         tablet          (Z12.11) Screen for colon cancer  Comment: Plans to schedule      No follow-ups on file.    Tanesha Stockton, CAMDEN CNP  [facility] [place] Shankar is a 57 year old, presenting for the following health issues:  Thyroid Problem      History of Present Illness       Hypothyroidism:     Since last visit, patient describes the following symptoms::  Fatigue    She eats 4 or more servings of fruits and vegetables daily.She consumes 1 sweetened beverage(s) daily.She exercises with enough effort to increase her heart rate 30 to 60 minutes per day.  She exercises with enough effort to increase her heart rate 4 days per week.   She is taking medications regularly.     Has appointment for pap with OBGYN on Thursday - declines physical today      Review of Systems   Constitutional, HEENT, cardiovascular, pulmonary, gi and gu systems are negative, except as otherwise noted.      Objective    /66 (BP Location: Right arm, Cuff Size: Adult Regular)   Pulse 56   Temp 97.2  F (36.2  C) (Tympanic)   Resp 16   Ht 1.708 m (5' 7.25\")   Wt 58.4 kg (128 lb 11.2 oz)   LMP  (LMP Unknown)   SpO2 99%   BMI 20.01 kg/m    Body mass index is 20.01 kg/m .  Physical Exam   GENERAL: healthy, alert and no distress  EYES: Eyes grossly normal to inspection, PERRL and conjunctivae and sclerae normal  HENT: ear canals and TM's normal, nose and mouth without ulcers or lesions  NECK: no adenopathy, no asymmetry, masses, or scars and thyroid normal to palpation  RESP: lungs clear to auscultation - no rales, rhonchi or wheezes  BREAST: normal without masses, tenderness or nipple discharge and no palpable axillary masses or adenopathy  CV: regular rate and rhythm, normal S1 S2, no " S3 or S4, no murmur, click or rub, no peripheral edema and peripheral pulses strong  ABDOMEN: soft, nontender, no hepatosplenomegaly, no masses and bowel sounds normal  MS: no gross musculoskeletal defects noted, no edema  SKIN: no suspicious lesions or rashes  NEURO: Normal strength and tone, mentation intact and speech normal  PSYCH: mentation appears normal, affect normal/bright

## 2023-01-26 ENCOUNTER — OFFICE VISIT (OUTPATIENT)
Dept: OBGYN | Facility: CLINIC | Age: 58
End: 2023-01-26
Payer: COMMERCIAL

## 2023-01-26 VITALS
OXYGEN SATURATION: 100 % | DIASTOLIC BLOOD PRESSURE: 72 MMHG | WEIGHT: 127.6 LBS | SYSTOLIC BLOOD PRESSURE: 126 MMHG | BODY MASS INDEX: 19.84 KG/M2 | HEART RATE: 58 BPM

## 2023-01-26 DIAGNOSIS — Z12.4 CERVICAL CANCER SCREENING: Primary | ICD-10-CM

## 2023-01-26 DIAGNOSIS — N95.2 VAGINAL ATROPHY: ICD-10-CM

## 2023-01-26 PROCEDURE — 87624 HPV HI-RISK TYP POOLED RSLT: CPT | Performed by: OBSTETRICS & GYNECOLOGY

## 2023-01-26 PROCEDURE — G0123 SCREEN CERV/VAG THIN LAYER: HCPCS | Performed by: OBSTETRICS & GYNECOLOGY

## 2023-01-26 PROCEDURE — 99203 OFFICE O/P NEW LOW 30 MIN: CPT | Performed by: OBSTETRICS & GYNECOLOGY

## 2023-01-26 RX ORDER — ESTRADIOL 0.1 MG/G
CREAM VAGINAL
Qty: 234 G | Refills: 0 | Status: SHIPPED | OUTPATIENT
Start: 2023-01-26 | End: 2024-01-29

## 2023-01-26 NOTE — PROGRESS NOTES
[facility]- OBGYN    CC: pap smear    S:Vonnie Nguyen is a 57 year old  who presents today for pap smear.  She would also like to discuss painful intercourse.  Patient reports she is experience vaginal dryness and pain with intercourse.  The pain is so severe she has not been able to have penetrative intercourse in over a year.  She has tried vaginal moisturizers, which have helped a small amount.    OBGYN Hx:   OB History    Para Term  AB Living   4 2 1 1 2 2   SAB IAB Ectopic Multiple Live Births   2 0 0 0 2      # Outcome Date GA Lbr Marcus/2nd Weight Sex Delivery Anes PTL Lv   4 Term 10/11/03 40w0d  3.402 kg (7 lb 8 oz) M    LIVE BIRTH      Name: Will   3  10/28/01 36w3d  2.495 kg (5 lb 8 oz) F    LIVE BIRTH      Birth Comments: PTL ->4wk hosp.      Name: Lavinia   2 SAB            1 SAB                No LMP recorded (lmp unknown). Patient is postmenopausal.   Menopause: last period 3 years ago  Sexually active with one male partner, her   STD Hx:none  Pap hx: 2017 NILM HPV negative    PMH:   Past Medical History:   Diagnosis Date     Grave's disease     Iodine 131 in      Hypothyroidism      Menarche age 13    cycles q  28-30 x 5 d       PSH:  Past Surgical History:   Procedure Laterality Date     EXTRACTION(S) DENTAL  age 21     Meds:  Current Outpatient Medications   Medication     Calcium Carbonate-Vitamin D (CALCIUM 600+D PO)     cholecalciferol (VITAMIN D3) 25 mcg (1000 units) capsule     estradiol (ESTRACE) 0.1 MG/GM vaginal cream     fish oil-omega-3 fatty acids 1000 MG capsule     Glucosamine-Chondroitin (GLUCOSAMINE CHONDR COMPLEX PO)     IRON PO     levothyroxine (SYNTHROID/LEVOTHROID) 125 MCG tablet     magnesium 250 MG tablet     multivitamin w/minerals (THERA-VIT-M) tablet     vitamin C with B complex (B COMPLEX-C) tablet     Probiotic Product (PROBIOTIC DAILY PO)     No current facility-administered medications for this visit.        Allergies:  Allergies   Allergen Reactions     No Known Allergies        SH: Denies any tobacco use    O: Patient Vitals for the past 24 hrs:   BP Pulse SpO2 Weight   23 1307 126/72 58 100 % 57.9 kg (127 lb 9.6 oz)   ]  Exam:  General- awake, alert, answering questions appropriately, appears comfortable  CV- regular rate  Lung- breathing comfortably on room air  - atrophic appearing external genitalia, atrophic appearing vaginal mucosa, atrophic appearing cervix, small bleeding with pap collection, small amount of thin light yellow tinged to white discharge     A&P: Vonnie Nguyen is a 57 year old  who presents today for pap smear.    (Z12.4) Cervical cancer screening  (primary encounter diagnosis)  Comment: pap due, collected  Plan: Pap screen with HPV - recommended age 30 - 65         years    (N95.2) Vaginal atrophy  Comment: Patient with exam findings and symptoms consistent with vaginal atrophy.  Discussed topical estrogen therapy.  Will start using nightly for 2 weeks, every other night for 2 weeks, and then twice weekly for maintenance.  Also discussed continued use of vaginal moisturizers.  Option to start using vaginal dilators when on maintenance with estrogen cream.  Discussed use of lubricants during intercourse when she resumes intercourse.   Plan: estradiol (ESTRACE) 0.1 MG/GM vaginal cream          Justina Anand MD

## 2023-01-30 LAB
BKR LAB AP GYN ADEQUACY: NORMAL
BKR LAB AP GYN INTERPRETATION: NORMAL
BKR LAB AP HPV REFLEX: NORMAL
BKR LAB AP PREVIOUS ABNORMAL: NORMAL
HUMAN PAPILLOMA VIRUS 16 DNA: NEGATIVE
HUMAN PAPILLOMA VIRUS 18 DNA: NEGATIVE
HUMAN PAPILLOMA VIRUS FINAL DIAGNOSIS: NORMAL
HUMAN PAPILLOMA VIRUS OTHER HR: NEGATIVE
PATH REPORT.COMMENTS IMP SPEC: NORMAL
PATH REPORT.COMMENTS IMP SPEC: NORMAL
PATH REPORT.RELEVANT HX SPEC: NORMAL

## 2024-01-04 ENCOUNTER — PATIENT OUTREACH (OUTPATIENT)
Dept: CARE COORDINATION | Facility: CLINIC | Age: 59
End: 2024-01-04
Payer: COMMERCIAL

## 2024-01-18 ENCOUNTER — PATIENT OUTREACH (OUTPATIENT)
Dept: CARE COORDINATION | Facility: CLINIC | Age: 59
End: 2024-01-18
Payer: COMMERCIAL

## 2024-01-24 ENCOUNTER — HOSPITAL ENCOUNTER (OUTPATIENT)
Dept: MAMMOGRAPHY | Facility: CLINIC | Age: 59
Discharge: HOME | End: 2024-01-24
Attending: NURSE PRACTITIONER | Admitting: NURSE PRACTITIONER
Payer: COMMERCIAL

## 2024-01-24 DIAGNOSIS — Z12.31 VISIT FOR SCREENING MAMMOGRAM: ICD-10-CM

## 2024-01-24 PROCEDURE — 77063 BREAST TOMOSYNTHESIS BI: CPT

## 2024-04-06 ENCOUNTER — MYC MEDICAL ADVICE (OUTPATIENT)
Dept: OBGYN | Facility: CLINIC | Age: 59
End: 2024-04-06
Payer: COMMERCIAL

## 2024-04-06 ENCOUNTER — HEALTH MAINTENANCE LETTER (OUTPATIENT)
Age: 59
End: 2024-04-06

## 2024-04-06 DIAGNOSIS — N95.2 VAGINAL ATROPHY: Primary | ICD-10-CM

## 2024-04-08 RX ORDER — ESTRADIOL 0.1 MG/G
2 CREAM VAGINAL
Qty: 42.5 G | Refills: 2 | Status: SHIPPED | OUTPATIENT
Start: 2024-04-08

## 2024-04-08 NOTE — TELEPHONE ENCOUNTER
Pt requesting refill of estradiol cream ad says is working well  Pt has annual with PCP in April, you want her to schedule just a short med check in soon?  Routing to provider to advise.  Alexia Coelho RN on 4/8/2024 at 11:57 AM

## 2024-04-11 DIAGNOSIS — E89.0 POSTABLATIVE HYPOTHYROIDISM: ICD-10-CM

## 2024-04-11 RX ORDER — LEVOTHYROXINE SODIUM 125 UG/1
125 TABLET ORAL DAILY
Qty: 90 TABLET | Refills: 0 | Status: SHIPPED | OUTPATIENT
Start: 2024-04-11 | End: 2024-04-30

## 2024-04-30 ENCOUNTER — OFFICE VISIT (OUTPATIENT)
Dept: PEDIATRICS | Facility: CLINIC | Age: 59
End: 2024-04-30
Payer: COMMERCIAL

## 2024-04-30 VITALS
HEIGHT: 67 IN | BODY MASS INDEX: 20.92 KG/M2 | OXYGEN SATURATION: 96 % | HEART RATE: 69 BPM | WEIGHT: 133.3 LBS | SYSTOLIC BLOOD PRESSURE: 116 MMHG | DIASTOLIC BLOOD PRESSURE: 72 MMHG | TEMPERATURE: 98 F | RESPIRATION RATE: 16 BRPM

## 2024-04-30 DIAGNOSIS — E03.9 HYPOTHYROIDISM, UNSPECIFIED TYPE: ICD-10-CM

## 2024-04-30 DIAGNOSIS — F34.1 DYSTHYMIA: ICD-10-CM

## 2024-04-30 DIAGNOSIS — Z00.00 HEALTHCARE MAINTENANCE: Primary | ICD-10-CM

## 2024-04-30 DIAGNOSIS — E89.0 POSTABLATIVE HYPOTHYROIDISM: ICD-10-CM

## 2024-04-30 DIAGNOSIS — Z12.11 SCREEN FOR COLON CANCER: ICD-10-CM

## 2024-04-30 PROCEDURE — 36415 COLL VENOUS BLD VENIPUNCTURE: CPT | Performed by: NURSE PRACTITIONER

## 2024-04-30 PROCEDURE — 90715 TDAP VACCINE 7 YRS/> IM: CPT | Performed by: NURSE PRACTITIONER

## 2024-04-30 PROCEDURE — 99213 OFFICE O/P EST LOW 20 MIN: CPT | Mod: 25 | Performed by: NURSE PRACTITIONER

## 2024-04-30 PROCEDURE — 99396 PREV VISIT EST AGE 40-64: CPT | Mod: 25 | Performed by: NURSE PRACTITIONER

## 2024-04-30 PROCEDURE — 80061 LIPID PANEL: CPT | Performed by: NURSE PRACTITIONER

## 2024-04-30 PROCEDURE — 82947 ASSAY GLUCOSE BLOOD QUANT: CPT | Performed by: NURSE PRACTITIONER

## 2024-04-30 PROCEDURE — 84443 ASSAY THYROID STIM HORMONE: CPT | Performed by: NURSE PRACTITIONER

## 2024-04-30 PROCEDURE — 90471 IMMUNIZATION ADMIN: CPT | Performed by: NURSE PRACTITIONER

## 2024-04-30 RX ORDER — LEVOTHYROXINE SODIUM 125 UG/1
125 TABLET ORAL DAILY
Qty: 90 TABLET | Refills: 3 | Status: SHIPPED | OUTPATIENT
Start: 2024-04-30

## 2024-04-30 ASSESSMENT — PAIN SCALES - GENERAL: PAINLEVEL: NO PAIN (0)

## 2024-04-30 NOTE — PROGRESS NOTES
"  Assessment & Plan     Healthcare maintenance  - TDAP 7+ (ADACEL,BOOSTRIX)  - Lipid panel reflex to direct LDL Fasting; Future  - Glucose; Future  - Lipid panel reflex to direct LDL Fasting  - Glucose  -Plans to get shingles at the pharmacy on another date.    Screen for colon cancer  - Colonoscopy Screening  Referral; Future    Hypothyroidism, unspecified type  Asymptomatic. Suspect anhedonia sx more related to chronic stress than thyroid issues.    TSH WITH FREE T4 REFLEX; Future  - TSH WITH FREE T4 REFLEX    Postablative hypothyroidism  - levothyroxine (SYNTHROID/LEVOTHROID) 125 MCG tablet; Take 1 tablet (125 mcg) by mouth daily    Dysthymia  Chronic stress. No SI/HI. Doesn't have a therapist and is quite isolated given children's mental health issues. Recommended therapy-names provided. Recommended wellbutrin. She will consider. She will simply mychart message me if wanting to start it-risks reviewed.                  Umm Shankar is a 58 year old, presenting for the following health issues:  Thyroid Problem        4/30/2024    11:14 AM   Additional Questions   Roomed by Esha Saini CMA     History of Present Illness       Hypothyroidism:     Since last visit, patient describes the following symptoms::  Fatigue and Weight gain    Weight gain::  5 lbs.    She eats 4 or more servings of fruits and vegetables daily.She consumes 1 sweetened beverage(s) daily.She exercises with enough effort to increase her heart rate 30 to 60 minutes per day.  She exercises with enough effort to increase her heart rate 5 days per week.   She is taking medications regularly.    Wants updated colon screening order             Review of Systems  Constitutional, HEENT, cardiovascular, pulmonary, gi and gu systems are negative, except as otherwise noted.      Objective    /72 (BP Location: Right arm, Cuff Size: Adult Regular)   Pulse 69   Temp 98  F (36.7  C) (Tympanic)   Resp 16   Ht 1.702 m (5' 7\")   Wt " 60.5 kg (133 lb 4.8 oz)   LMP  (LMP Unknown)   SpO2 96%   BMI 20.88 kg/m    Body mass index is 20.88 kg/m .  Physical Exam   GENERAL: alert and no distress  EYES: Eyes grossly normal to inspection, PERRL and conjunctivae and sclerae normal  HENT: ear canals and TM's normal, nose and mouth without ulcers or lesions  NECK: no adenopathy, no asymmetry, masses, or scars  RESP: lungs clear to auscultation - no rales, rhonchi or wheezes  CV: regular rate and rhythm, normal S1 S2, no S3 or S4, no murmur, click or rub, no peripheral edema  ABDOMEN: soft, nontender, no hepatosplenomegaly, no masses and bowel sounds normal  MS: no gross musculoskeletal defects noted, no edema  SKIN: no suspicious lesions or rashes  NEURO: Normal strength and tone, mentation intact and speech normal  PSYCH: mentation appears normal, affect normal/bright            Signed Electronically by: CAMDEN TORRES CNP

## 2024-05-01 LAB
CHOLEST SERPL-MCNC: 216 MG/DL
FASTING STATUS PATIENT QL REPORTED: YES
FASTING STATUS PATIENT QL REPORTED: YES
GLUCOSE SERPL-MCNC: 96 MG/DL (ref 70–99)
HDLC SERPL-MCNC: 98 MG/DL
LDLC SERPL CALC-MCNC: 105 MG/DL
NONHDLC SERPL-MCNC: 118 MG/DL
TRIGL SERPL-MCNC: 66 MG/DL
TSH SERPL DL<=0.005 MIU/L-ACNC: 1.5 UIU/ML (ref 0.3–4.2)

## 2024-11-04 ENCOUNTER — IMMUNIZATION (OUTPATIENT)
Dept: PEDIATRICS | Facility: CLINIC | Age: 59
End: 2024-11-04
Payer: COMMERCIAL

## 2024-11-04 DIAGNOSIS — Z23 NEED FOR PROPHYLACTIC VACCINATION AND INOCULATION AGAINST INFLUENZA: ICD-10-CM

## 2024-11-04 DIAGNOSIS — Z23 HIGH PRIORITY FOR 2019-NCOV VACCINE: Primary | ICD-10-CM

## 2024-11-04 PROCEDURE — 99207 PR NO CHARGE NURSE ONLY: CPT

## 2024-11-04 PROCEDURE — 90471 IMMUNIZATION ADMIN: CPT

## 2024-11-04 PROCEDURE — 91320 SARSCV2 VAC 30MCG TRS-SUC IM: CPT

## 2024-11-04 PROCEDURE — 90673 RIV3 VACCINE NO PRESERV IM: CPT

## 2024-11-04 PROCEDURE — 90480 ADMN SARSCOV2 VAC 1/ONLY CMP: CPT

## 2025-02-17 ENCOUNTER — HOSPITAL ENCOUNTER (OUTPATIENT)
Dept: MAMMOGRAPHY | Facility: CLINIC | Age: 60
Discharge: HOME | End: 2025-02-17
Attending: NURSE PRACTITIONER | Admitting: NURSE PRACTITIONER
Payer: COMMERCIAL

## 2025-02-17 DIAGNOSIS — Z12.31 VISIT FOR SCREENING MAMMOGRAM: ICD-10-CM

## 2025-02-17 PROCEDURE — 77063 BREAST TOMOSYNTHESIS BI: CPT

## 2025-03-31 ENCOUNTER — PATIENT OUTREACH (OUTPATIENT)
Dept: CARE COORDINATION | Facility: CLINIC | Age: 60
End: 2025-03-31
Payer: COMMERCIAL

## 2025-04-14 ENCOUNTER — PATIENT OUTREACH (OUTPATIENT)
Dept: CARE COORDINATION | Facility: CLINIC | Age: 60
End: 2025-04-14
Payer: COMMERCIAL

## 2025-04-14 ENCOUNTER — TELEPHONE (OUTPATIENT)
Dept: GASTROENTEROLOGY | Facility: CLINIC | Age: 60
End: 2025-04-14
Payer: COMMERCIAL

## 2025-04-14 NOTE — TELEPHONE ENCOUNTER
"Endoscopy Scheduling Screen    Have you had any respiratory illness or flu-like symptoms in the last 10 days?  No    What is your communication preference for Instructions and/or Bowel Prep?   MyChart    What insurance is in the chart?  Other:  [facility]    Ordering/Referring Provider:   LEANDRO SUMMERS    (If ordering provider performs procedure, schedule with ordering provider unless otherwise instructed. )    BMI: Estimated body mass index is 20.88 kg/m  as calculated from the following:    Height as of 4/30/24: 1.702 m (5' 7\").    Weight as of 4/30/24: 60.5 kg (133 lb 4.8 oz).     Sedation Ordered  moderate sedation.   If patient BMI > 50 do not schedule in [facility].    If patient BMI > 45 do not schedule at [facility].    Are you taking methadone or Suboxone?  NO, No RN review required.    Have you been diagnosed and are being treated for severe PTSD or severe anxiety?  NO, No RN review required.    Are you taking any prescription medications for pain 3 or more times per week?   NO, No RN review required.    Do you have a history of malignant hyperthermia?  No    (Females) Are you currently pregnant?   No     Have you been diagnosed or told you have pulmonary hypertension?   No    Do you have an LVAD?  No    Have you been told you have moderate to severe sleep apnea?  No.    Have you been told you have COPD, asthma, or any other lung disease?  No    Has your doctor ordered any cardiac tests like echo, angiogram, stress test, ablation, or EKG, that you have not completed yet?  No    Do you  have a history of any heart conditions?  No     Have you ever had or are you waiting for an organ transplant?  No. Continue scheduling, no site restrictions.    Have you had a stroke or transient ischemic attack (TIA aka \"mini stroke\") in the last 2 years?   No.    Have you been diagnosed with or been told you have cirrhosis of the liver?   No.    Are you currently on dialysis?   No    Do you need assistance " "transferring?   No    BMI: Estimated body mass index is 20.88 kg/m  as calculated from the following:    Height as of 4/30/24: 1.702 m (5' 7\").    Weight as of 4/30/24: 60.5 kg (133 lb 4.8 oz).     Is patients BMI > 40 and scheduling location [facility]?  No    Do you take an injectable or oral medication for weight loss or diabetes (excluding insulin)?  No    Do you take the medication Naltrexone?  No    Do you take blood thinners?  No       Prep   Are you currently on dialysis or do you have chronic kidney disease?  No    Do you have a diagnosis of diabetes?  No    Do you have a diagnosis of cystic fibrosis (CF)?  No    On a regular basis do you go 3 -5 days between bowel movements?  No    BMI > 40?  No    Preferred Pharmacy:        [id number] - [place], [place] - [address]  [address]  [place] [address]  Phone: [phone] Fax: [phone]          Final Scheduling Details     Procedure scheduled  Colonoscopy    Surgeon:  Janice    Date of procedure:  4/23     Pre-OP / PAC:   No - Not required for this site.    Location  [facility] - Patient preference.    Sedation   Moderate Sedation - Per order.      Patient Reminders:   You will receive a call from a Nurse to review instructions and health history.  This assessment must be completed prior to your procedure.  Failure to complete the Nurse assessment may result in the procedure being cancelled.      On the day of your procedure, please designate an adult(s) who can drive you home stay with you for the next 24 hours. The medicines used in the exam will make you sleepy. You will not be able to drive.      You cannot take public transportation, ride share services, or non-medical taxi service without a responsible caregiver.  Medical transport services are allowed with the requirement that a responsible caregiver will receive you at your destination.  We require that drivers and caregivers are confirmed prior to your " procedure.

## 2025-04-14 NOTE — TELEPHONE ENCOUNTER
Pre visit planning completed.      Procedure details:    Patient scheduled for Colonoscopy on 04.23.2025.     Arrival time: 1230. Procedure time 1315    Facility location: [facility]; [address] [place] [address]. Check in location: Main entrance, door #1 on the North side of the building under roundabout awning. DO NOT GO TO SURGERY/ED ENTRANCE.     Sedation type: Conscious sedation     Pre op exam needed? No.    Indication for procedure:  Screen for colon cancer       Chart review:     Electronic implanted devices? No    Recent diagnosis of diverticulitis within the last 6 weeks? No      Medication review:    Diabetic? No    Anticoagulants? No    Weight loss medication/injectable? No GLP-1 medication per patient's medication list. Nursing to verify with pre-assessment call.    Other medication HOLDING recommendations:  Ferrous sulfate (iron supplements): HOLD 7 days before procedure.      Prep for procedure:     Bowel prep recommendation: Standard Miralax.   Due to: standard bowel prep    Procedure information and instructions sent via [employer]         Linda Wayne RN  Endoscopy Procedure Pre Assessment   [phone] option 3

## 2025-04-15 NOTE — TELEPHONE ENCOUNTER
Attempted to contact patient in order to complete pre assessment questions.     No answer. Left message to return call to [phone] option 3.    Callback communication sent via [employer].    Astrid March LPN

## 2025-04-16 NOTE — TELEPHONE ENCOUNTER
Pre assessment completed for upcoming procedure.   (Please see previous telephone encounter notes for complete details)    Patient returned call.       Procedure details:    Arrival time and facility location reviewed.    Pre op exam needed? No.    Designated  policy reviewed. Instructed to have someone stay 6  hours post procedure.       Medication review:    Ferrous Sulfate (iron supplement): HOLD 7 days before procedure.      Prep for procedure:     Procedure prep instructions reviewed.        Any additional information needed:  N/A      Patient verbalized understanding and had no questions or concerns at this time.      Sabrina Adair RN  Endoscopy Procedure Pre Assessment   [phone] option 3

## 2025-04-22 ENCOUNTER — OFFICE VISIT (OUTPATIENT)
Dept: PEDIATRICS | Facility: CLINIC | Age: 60
End: 2025-04-22
Payer: COMMERCIAL

## 2025-04-22 VITALS
WEIGHT: 130.8 LBS | OXYGEN SATURATION: 100 % | HEIGHT: 67 IN | BODY MASS INDEX: 20.53 KG/M2 | SYSTOLIC BLOOD PRESSURE: 117 MMHG | RESPIRATION RATE: 15 BRPM | HEART RATE: 63 BPM | TEMPERATURE: 97.7 F | DIASTOLIC BLOOD PRESSURE: 74 MMHG

## 2025-04-22 DIAGNOSIS — E03.9 HYPOTHYROIDISM, UNSPECIFIED TYPE: Primary | ICD-10-CM

## 2025-04-22 DIAGNOSIS — E89.0 POSTABLATIVE HYPOTHYROIDISM: ICD-10-CM

## 2025-04-22 PROCEDURE — G2211 COMPLEX E/M VISIT ADD ON: HCPCS | Performed by: NURSE PRACTITIONER

## 2025-04-22 PROCEDURE — 3074F SYST BP LT 130 MM HG: CPT | Performed by: NURSE PRACTITIONER

## 2025-04-22 PROCEDURE — 99213 OFFICE O/P EST LOW 20 MIN: CPT | Performed by: NURSE PRACTITIONER

## 2025-04-22 PROCEDURE — 3078F DIAST BP <80 MM HG: CPT | Performed by: NURSE PRACTITIONER

## 2025-04-22 PROCEDURE — 1126F AMNT PAIN NOTED NONE PRSNT: CPT | Performed by: NURSE PRACTITIONER

## 2025-04-22 RX ORDER — LEVOTHYROXINE SODIUM 125 UG/1
125 TABLET ORAL DAILY
Qty: 90 TABLET | Refills: 3 | Status: SHIPPED | OUTPATIENT
Start: 2025-04-22

## 2025-04-22 ASSESSMENT — PATIENT HEALTH QUESTIONNAIRE - PHQ9
SUM OF ALL RESPONSES TO PHQ QUESTIONS 1-9: 0
SUM OF ALL RESPONSES TO PHQ QUESTIONS 1-9: 0
10. IF YOU CHECKED OFF ANY PROBLEMS, HOW DIFFICULT HAVE THESE PROBLEMS MADE IT FOR YOU TO DO YOUR WORK, TAKE CARE OF THINGS AT HOME, OR GET ALONG WITH OTHER PEOPLE: NOT DIFFICULT AT ALL

## 2025-04-22 ASSESSMENT — PAIN SCALES - GENERAL: PAINLEVEL_OUTOF10: NO PAIN (0)

## 2025-04-22 NOTE — PROGRESS NOTES
"  Assessment & Plan     Hypothyroidism, unspecified type  Stable. Due for labs  - TSH WITH FREE T4 REFLEX; Future  - levothyroxine (SYNTHROID/LEVOTHROID) 125 MCG tablet; Take 1 tablet (125 mcg) by mouth daily.            Umm Shankar is a 59 year old, presenting for the following health issues:  Recheck Medication        4/22/2025    10:41 AM   Additional Questions   Roomed by Carlota POLLARD   Accompanied by Self         4/22/2025    10:41 AM   Patient Reported Additional Medications   Patient reports taking the following new medications NA     History of Present Illness       Hypothyroidism:     Since last visit, patient describes the following symptoms::  None    Weight gain::  No weight gain    Weight loss::  No weight loss    Reason for visit:  Med Check  Symptom onset:  Today  Symptoms include:  NA  : NA.  : NA.  : NA.  Prior treatment description:  NA  What makes it worse:  NA  What makes it better:  NA    She eats 4 or more servings of fruits and vegetables daily.She consumes 0 sweetened beverage(s) daily.She exercises with enough effort to increase her heart rate 30 to 60 minutes per day.  She exercises with enough effort to increase her heart rate 5 days per week.   She is taking medications regularly.                      Objective    /74 (BP Location: Right arm, Patient Position: Sitting, Cuff Size: Adult Regular)   Pulse 63   Temp 97.7  F (36.5  C) (Oral)   Resp 15   Ht 1.702 m (5' 7.01\")   Wt 59.3 kg (130 lb 12.8 oz)   LMP  (LMP Unknown)   SpO2 100%   BMI 20.48 kg/m    Body mass index is 20.48 kg/m .  Physical Exam   Neck: No lymphadenopathy or masses. Thyroid smooth, non-tender, and non-enlarged.  HRRR S1 S2 No MRG. No peripheral edema  CONSTITUTIONAL: Alert, well-nourished, well-groomed, NAD              Signed Electronically by: CAMDEN TORRES CNP    "

## 2025-04-23 ENCOUNTER — HOSPITAL ENCOUNTER (OUTPATIENT)
Facility: CLINIC | Age: 60
Discharge: HOME | End: 2025-04-23
Attending: COLON & RECTAL SURGERY | Admitting: COLON & RECTAL SURGERY
Payer: COMMERCIAL

## 2025-04-23 VITALS
HEIGHT: 67 IN | OXYGEN SATURATION: 99 % | WEIGHT: 130 LBS | RESPIRATION RATE: 14 BRPM | DIASTOLIC BLOOD PRESSURE: 72 MMHG | BODY MASS INDEX: 20.4 KG/M2 | SYSTOLIC BLOOD PRESSURE: 117 MMHG | HEART RATE: 65 BPM

## 2025-04-23 LAB — COLONOSCOPY: NORMAL

## 2025-04-23 PROCEDURE — 250N000011 HC RX IP 250 OP 636: Performed by: COLON & RECTAL SURGERY

## 2025-04-23 PROCEDURE — 88305 TISSUE EXAM BY PATHOLOGIST: CPT | Mod: TC | Performed by: COLON & RECTAL SURGERY

## 2025-04-23 PROCEDURE — 45380 COLONOSCOPY AND BIOPSY: CPT | Mod: PT | Performed by: COLON & RECTAL SURGERY

## 2025-04-23 PROCEDURE — G0500 MOD SEDAT ENDO SERVICE >5YRS: HCPCS | Performed by: COLON & RECTAL SURGERY

## 2025-04-23 PROCEDURE — 88305 TISSUE EXAM BY PATHOLOGIST: CPT | Mod: 26 | Performed by: PATHOLOGY

## 2025-04-23 RX ORDER — NALOXONE HYDROCHLORIDE 0.4 MG/ML
0.4 INJECTION, SOLUTION INTRAMUSCULAR; INTRAVENOUS; SUBCUTANEOUS
Status: DISCONTINUED | OUTPATIENT
Start: 2025-04-23 | End: 2025-04-23 | Stop reason: HOSPADM

## 2025-04-23 RX ORDER — PROCHLORPERAZINE MALEATE 10 MG
10 TABLET ORAL EVERY 6 HOURS PRN
Status: DISCONTINUED | OUTPATIENT
Start: 2025-04-23 | End: 2025-04-23 | Stop reason: HOSPADM

## 2025-04-23 RX ORDER — SIMETHICONE 40MG/0.6ML
133 SUSPENSION, DROPS(FINAL DOSAGE FORM)(ML) ORAL
Status: DISCONTINUED | OUTPATIENT
Start: 2025-04-23 | End: 2025-04-23 | Stop reason: HOSPADM

## 2025-04-23 RX ORDER — DIPHENHYDRAMINE HYDROCHLORIDE 50 MG/ML
25-50 INJECTION, SOLUTION INTRAMUSCULAR; INTRAVENOUS
Status: DISCONTINUED | OUTPATIENT
Start: 2025-04-23 | End: 2025-04-23 | Stop reason: HOSPADM

## 2025-04-23 RX ORDER — NALOXONE HYDROCHLORIDE 0.4 MG/ML
0.2 INJECTION, SOLUTION INTRAMUSCULAR; INTRAVENOUS; SUBCUTANEOUS
Status: DISCONTINUED | OUTPATIENT
Start: 2025-04-23 | End: 2025-04-23 | Stop reason: HOSPADM

## 2025-04-23 RX ORDER — EPINEPHRINE 1 MG/ML
0.1 INJECTION, SOLUTION, CONCENTRATE INTRAVENOUS
Status: DISCONTINUED | OUTPATIENT
Start: 2025-04-23 | End: 2025-04-23 | Stop reason: HOSPADM

## 2025-04-23 RX ORDER — FLUMAZENIL 0.1 MG/ML
0.2 INJECTION, SOLUTION INTRAVENOUS
Status: DISCONTINUED | OUTPATIENT
Start: 2025-04-23 | End: 2025-04-23 | Stop reason: HOSPADM

## 2025-04-23 RX ORDER — FENTANYL CITRATE 50 UG/ML
25-100 INJECTION, SOLUTION INTRAMUSCULAR; INTRAVENOUS EVERY 5 MIN PRN
Status: DISCONTINUED | OUTPATIENT
Start: 2025-04-23 | End: 2025-04-23 | Stop reason: HOSPADM

## 2025-04-23 RX ORDER — ONDANSETRON 2 MG/ML
4 INJECTION INTRAMUSCULAR; INTRAVENOUS EVERY 6 HOURS PRN
Status: DISCONTINUED | OUTPATIENT
Start: 2025-04-23 | End: 2025-04-23 | Stop reason: HOSPADM

## 2025-04-23 RX ORDER — ATROPINE SULFATE 0.1 MG/ML
1 INJECTION INTRAVENOUS
Status: DISCONTINUED | OUTPATIENT
Start: 2025-04-23 | End: 2025-04-23 | Stop reason: HOSPADM

## 2025-04-23 RX ORDER — ONDANSETRON 2 MG/ML
4 INJECTION INTRAMUSCULAR; INTRAVENOUS
Status: DISCONTINUED | OUTPATIENT
Start: 2025-04-23 | End: 2025-04-23 | Stop reason: HOSPADM

## 2025-04-23 RX ORDER — ONDANSETRON 4 MG/1
4 TABLET, ORALLY DISINTEGRATING ORAL EVERY 6 HOURS PRN
Status: DISCONTINUED | OUTPATIENT
Start: 2025-04-23 | End: 2025-04-23 | Stop reason: HOSPADM

## 2025-04-23 RX ORDER — LIDOCAINE 40 MG/G
CREAM TOPICAL
Status: DISCONTINUED | OUTPATIENT
Start: 2025-04-23 | End: 2025-04-23 | Stop reason: HOSPADM

## 2025-04-23 RX ADMIN — MIDAZOLAM 1 MG: 1 INJECTION INTRAMUSCULAR; INTRAVENOUS at 10:41

## 2025-04-23 RX ADMIN — FENTANYL CITRATE 50 MCG: 50 INJECTION, SOLUTION INTRAMUSCULAR; INTRAVENOUS at 10:41

## 2025-04-23 ASSESSMENT — ACTIVITIES OF DAILY LIVING (ADL): ADLS_ACUITY_SCORE: 41

## 2025-04-23 NOTE — H&P
Pre-Endoscopy History and Physical     Vonnie Nguyen MRN# [id number]   YOB: 1965 Age: 59 year old     Date of Procedure: 4/23/2025  Primary care provider: Tanesha Solares  Type of Endoscopy: colonoscopy  Reason for Procedure: screening  Type of Anesthesia Anticipated: Moderate Sedation    HPI:    Vonnie is a 59 year old female who will be undergoing the above procedure.      A history and physical has been performed. The patient's medications and allergies have been reviewed. The risks and benefits of the procedure and the sedation options and risks were discussed with the patient.  All questions were answered and informed consent was obtained.      She denies a personal or family history of anesthesia complications or bleeding disorders.     Allergies   Allergen Reactions    No Known Allergies         Prior to Admission Medications   Prescriptions Last Dose Informant Patient Reported? Taking?   Calcium Carbonate-Vitamin D (CALCIUM 600+D PO)   Yes No   Glucosamine-Chondroitin (GLUCOSAMINE CHONDR COMPLEX PO)   Yes No   IRON PO   Yes No   Probiotic Product (PROBIOTIC DAILY PO)   Yes No   cholecalciferol (VITAMIN D3) 25 mcg (1000 units) capsule   Yes No   estradiol (ESTRACE) 0.1 MG/GM vaginal cream   No No   Sig: Place 2 g vaginally twice a week.   fish oil-omega-3 fatty acids 1000 MG capsule   Yes No   Sig: Take 6 g by mouth daily   levothyroxine (SYNTHROID/LEVOTHROID) 125 MCG tablet   No No   Sig: Take 1 tablet (125 mcg) by mouth daily.   magnesium 250 MG tablet   Yes No   multivitamin w/minerals (THERA-VIT-M) tablet   Yes No   Sig: Take 1 tablet by mouth daily   vitamin B-Complex   Yes No   Sig: Take 1 tablet by mouth daily      Facility-Administered Medications: None       Patient Active Problem List   Diagnosis    Hypothyroidism    Elevated bilirubin        Past Medical History:   Diagnosis Date    Grave's disease 1980    Iodine 131 in 1983    Hypothyroidism     Menarche age 13    cycles q   "28-30 x 5 d        Past Surgical History:   Procedure Laterality Date    EXTRACTION(S) DENTAL  age 21       Social History     Tobacco Use    Smoking status: Never     Passive exposure: Never    Smokeless tobacco: Never   Substance Use Topics    Alcohol use: Yes     Comment: very minimal       Family History   Problem Relation Age of Onset    Hypertension Mother 64    Cancer Mother         lung Ca (smoker) basal skin    Other Cancer Mother         Lung cancer    Prostate Cancer Father          age 78    Hypertension Brother     Breast Cancer No family hx of     Ovarian Cancer No family hx of        REVIEW OF SYSTEMS:     5 point ROS negative except as noted above in HPI, including Gen., Resp., CV, GI &  system review.      PHYSICAL EXAM:   LMP  (LMP Unknown)  Estimated body mass index is 20.48 kg/m  as calculated from the following:    Height as of 25: 1.702 m (5' 7.01\").    Weight as of 25: 59.3 kg (130 lb 12.8 oz).   GENERAL APPEARANCE: healthy and alert  MENTAL STATUS: alert  AIRWAY EXAM: Mallampatti Class II (visualization of the soft palate, fauces, and uvula)  RESP: lungs clear to auscultation - no rales, rhonchi or wheezes  CV: regular rates and rhythm      DIAGNOSTICS:    Not indicated      IMPRESSION   ASA Class 1 - Healthy patient, no medical problems        PLAN:       Plan for colonoscopy. We discussed the risks, benefits and alternatives and the patient wished to proceed.    The above has been forwarded to the consulting provider.      Signed Electronically by: Kayley Camacho MD  2025    "

## 2025-04-24 LAB
PATH REPORT.COMMENTS IMP SPEC: NORMAL
PATH REPORT.COMMENTS IMP SPEC: NORMAL
PATH REPORT.FINAL DX SPEC: NORMAL
PATH REPORT.GROSS SPEC: NORMAL
PATH REPORT.MICROSCOPIC SPEC OTHER STN: NORMAL
PATH REPORT.RELEVANT HX SPEC: NORMAL
PHOTO IMAGE: NORMAL

## 2025-05-07 ENCOUNTER — PATIENT OUTREACH (OUTPATIENT)
Dept: GASTROENTEROLOGY | Facility: CLINIC | Age: 60
End: 2025-05-07
Payer: COMMERCIAL

## 2025-05-07 ENCOUNTER — RESULTS FOLLOW-UP (OUTPATIENT)
Dept: GASTROENTEROLOGY | Facility: CLINIC | Age: 60
End: 2025-05-07

## 2025-05-07 DIAGNOSIS — D12.6 ADENOMATOUS POLYP OF COLON, UNSPECIFIED PART OF COLON: Primary | ICD-10-CM

## 2025-05-07 DIAGNOSIS — E03.9 HYPOTHYROIDISM, UNSPECIFIED TYPE: ICD-10-CM

## 2025-05-07 RX ORDER — LEVOTHYROXINE SODIUM 125 UG/1
125 TABLET ORAL DAILY
Qty: 90 TABLET | Refills: 1 | Status: SHIPPED | OUTPATIENT
Start: 2025-05-07

## 2025-05-07 NOTE — TELEPHONE ENCOUNTER
Routing to refill team.     Patient requesting transfer of prescription of Levothyroxine to [facility] pharmacy.     EARLINE OrtizN RN  [facility]

## 2025-05-07 NOTE — TELEPHONE ENCOUNTER
Outgoing call to patient - attempt #1. Left  requesting call back to [phone] and ask to speak with a nurse. Awaiting call back at this time.    [employer] message sent asking patient to confirm pharmacy.     See routing comment from refill team:      Sammie WAITE RN  [facility]

## 2025-05-29 ENCOUNTER — DOCUMENTATION ONLY (OUTPATIENT)
Dept: LAB | Facility: CLINIC | Age: 60
End: 2025-05-29
Payer: COMMERCIAL

## 2025-05-29 DIAGNOSIS — R17 ELEVATED BILIRUBIN: Primary | ICD-10-CM

## 2025-05-29 NOTE — PROGRESS NOTES
Vonnie Nguyen has an upcoming lab appointment:    Future Appointments   Date Time Provider Department Center   6/3/2025  1:30 PM [facility]     Patient is scheduled for the following lab(s): hepatic panel needs an ICD10     There is no order available. Please review and place either future orders or HMPO (Review of Health Maintenance Protocol Orders), as appropriate.    Health Maintenance Due   Topic    HEPATIC PANEL     TSH W/FREE T4 REFLEX      If no labs are needed at this time please have the Care Team contact patient regarding this information and cancel lab appointment. Thank you.     Charleen Teran. BRITT

## 2025-06-05 ENCOUNTER — LAB (OUTPATIENT)
Dept: LAB | Facility: CLINIC | Age: 60
End: 2025-06-05
Payer: COMMERCIAL

## 2025-06-05 DIAGNOSIS — R17 ELEVATED BILIRUBIN: ICD-10-CM

## 2025-06-05 DIAGNOSIS — E03.9 HYPOTHYROIDISM, UNSPECIFIED TYPE: ICD-10-CM

## 2025-06-05 PROCEDURE — 80076 HEPATIC FUNCTION PANEL: CPT

## 2025-06-05 PROCEDURE — 36415 COLL VENOUS BLD VENIPUNCTURE: CPT

## 2025-06-05 PROCEDURE — 84443 ASSAY THYROID STIM HORMONE: CPT

## 2025-06-05 PROCEDURE — 82977 ASSAY OF GGT: CPT

## 2025-06-06 ENCOUNTER — RESULTS FOLLOW-UP (OUTPATIENT)
Dept: PEDIATRICS | Facility: CLINIC | Age: 60
End: 2025-06-06

## 2025-06-06 LAB
ALBUMIN SERPL BCG-MCNC: 4.3 G/DL (ref 3.5–5.2)
ALP SERPL-CCNC: 44 U/L (ref 40–150)
ALT SERPL W P-5'-P-CCNC: 30 U/L (ref 0–50)
AST SERPL W P-5'-P-CCNC: 29 U/L (ref 0–45)
BILIRUB SERPL-MCNC: 1.3 MG/DL
BILIRUBIN DIRECT (ROCHE PRO & PURE): 0.46 MG/DL (ref 0–0.45)
GGT SERPL-CCNC: 18 U/L (ref 5–36)
PROT SERPL-MCNC: 6.7 G/DL (ref 6.4–8.3)
TSH SERPL DL<=0.005 MIU/L-ACNC: 0.89 UIU/ML (ref 0.3–4.2)

## 2025-06-15 ENCOUNTER — HEALTH MAINTENANCE LETTER (OUTPATIENT)
Age: 60
End: 2025-06-15

## (undated) DEVICE — ENDO FORCEP SPIKED SERRATED SHAFT JUMBO 239CM G56998

## (undated) RX ORDER — FENTANYL CITRATE 50 UG/ML
INJECTION, SOLUTION INTRAMUSCULAR; INTRAVENOUS
Status: DISPENSED
Start: 2025-04-23